# Patient Record
Sex: FEMALE | Race: WHITE | NOT HISPANIC OR LATINO | Employment: FULL TIME | URBAN - METROPOLITAN AREA
[De-identification: names, ages, dates, MRNs, and addresses within clinical notes are randomized per-mention and may not be internally consistent; named-entity substitution may affect disease eponyms.]

---

## 2023-03-01 LAB
GLUCOSE THREE HOUR: 97 MG/DL
GLUCOSE TWO HOUR: 135 MG/DL
GLUCOSE, FASTING: 84 MG/DL
GLUCOSE, ONE HOUR: 169 MG/DL
GTTCM: NORMAL

## 2023-05-12 LAB — GROUP B STREP SCREEN: NORMAL

## 2023-08-10 LAB
ALANINE AMINOTRANSFERASE (SGPT) (U/L) IN SER/PLAS: 33 U/L (ref 7–45)
ALBUMIN (G/DL) IN SER/PLAS: 4.4 G/DL (ref 3.4–5)
ALKALINE PHOSPHATASE (U/L) IN SER/PLAS: 111 U/L (ref 33–110)
ANION GAP IN SER/PLAS: 13 MMOL/L (ref 10–20)
APPEARANCE, URINE: CLEAR
ASPARTATE AMINOTRANSFERASE (SGOT) (U/L) IN SER/PLAS: 20 U/L (ref 9–39)
BILIRUBIN TOTAL (MG/DL) IN SER/PLAS: 0.4 MG/DL (ref 0–1.2)
BILIRUBIN, URINE: NEGATIVE
BLOOD, URINE: NEGATIVE
CALCIDIOL (25 OH VITAMIN D3) (NG/ML) IN SER/PLAS: 32 NG/ML
CALCIUM (MG/DL) IN SER/PLAS: 9.7 MG/DL (ref 8.6–10.3)
CARBON DIOXIDE, TOTAL (MMOL/L) IN SER/PLAS: 24 MMOL/L (ref 21–32)
CHLORIDE (MMOL/L) IN SER/PLAS: 106 MMOL/L (ref 98–107)
CHOLESTEROL (MG/DL) IN SER/PLAS: 196 MG/DL (ref 0–199)
CHOLESTEROL IN HDL (MG/DL) IN SER/PLAS: 60.2 MG/DL
CHOLESTEROL/HDL RATIO: 3.3
COLOR, URINE: YELLOW
CREATININE (MG/DL) IN SER/PLAS: 0.64 MG/DL (ref 0.5–1.05)
ERYTHROCYTE DISTRIBUTION WIDTH (RATIO) BY AUTOMATED COUNT: 12.4 % (ref 11.5–14.5)
ERYTHROCYTE MEAN CORPUSCULAR HEMOGLOBIN CONCENTRATION (G/DL) BY AUTOMATED: 32.1 G/DL (ref 32–36)
ERYTHROCYTE MEAN CORPUSCULAR VOLUME (FL) BY AUTOMATED COUNT: 90 FL (ref 80–100)
ERYTHROCYTES (10*6/UL) IN BLOOD BY AUTOMATED COUNT: 5.19 X10E12/L (ref 4–5.2)
GFR FEMALE: >90 ML/MIN/1.73M2
GLUCOSE (MG/DL) IN SER/PLAS: 82 MG/DL (ref 74–99)
GLUCOSE, URINE: NEGATIVE MG/DL
HEMATOCRIT (%) IN BLOOD BY AUTOMATED COUNT: 46.7 % (ref 36–46)
HEMOGLOBIN (G/DL) IN BLOOD: 15 G/DL (ref 12–16)
KETONES, URINE: NEGATIVE MG/DL
LDL: 124 MG/DL (ref 0–99)
LEUKOCYTE ESTERASE, URINE: NEGATIVE
LEUKOCYTES (10*3/UL) IN BLOOD BY AUTOMATED COUNT: 10.3 X10E9/L (ref 4.4–11.3)
NITRITE, URINE: NEGATIVE
PH, URINE: 5 (ref 5–8)
PLATELETS (10*3/UL) IN BLOOD AUTOMATED COUNT: 363 X10E9/L (ref 150–450)
POTASSIUM (MMOL/L) IN SER/PLAS: 4.1 MMOL/L (ref 3.5–5.3)
PROTEIN TOTAL: 7.7 G/DL (ref 6.4–8.2)
PROTEIN, URINE: NEGATIVE MG/DL
SODIUM (MMOL/L) IN SER/PLAS: 139 MMOL/L (ref 136–145)
SPECIFIC GRAVITY, URINE: 1.01 (ref 1–1.03)
THYROTROPIN (MIU/L) IN SER/PLAS BY DETECTION LIMIT <= 0.05 MIU/L: 1.85 MIU/L (ref 0.44–3.98)
TRIGLYCERIDE (MG/DL) IN SER/PLAS: 61 MG/DL (ref 0–149)
UREA NITROGEN (MG/DL) IN SER/PLAS: 12 MG/DL (ref 6–23)
UROBILINOGEN, URINE: <2 MG/DL (ref 0–1.9)
VLDL: 12 MG/DL (ref 0–40)

## 2023-10-20 ENCOUNTER — PATIENT MESSAGE (OUTPATIENT)
Dept: PRIMARY CARE | Facility: CLINIC | Age: 38
End: 2023-10-20
Payer: COMMERCIAL

## 2023-10-20 DIAGNOSIS — E01.0 THYROMEGALY: Primary | ICD-10-CM

## 2023-10-31 ENCOUNTER — HOSPITAL ENCOUNTER (OUTPATIENT)
Dept: RADIOLOGY | Facility: HOSPITAL | Age: 38
Discharge: HOME | End: 2023-10-31
Payer: COMMERCIAL

## 2023-10-31 DIAGNOSIS — E01.0 THYROMEGALY: ICD-10-CM

## 2023-10-31 PROCEDURE — 76536 US EXAM OF HEAD AND NECK: CPT | Performed by: STUDENT IN AN ORGANIZED HEALTH CARE EDUCATION/TRAINING PROGRAM

## 2023-10-31 PROCEDURE — 76536 US EXAM OF HEAD AND NECK: CPT

## 2023-11-01 ENCOUNTER — TELEMEDICINE (OUTPATIENT)
Dept: PRIMARY CARE | Facility: CLINIC | Age: 38
End: 2023-11-01
Payer: COMMERCIAL

## 2023-11-01 DIAGNOSIS — E04.1 THYROID NODULE: Primary | ICD-10-CM

## 2023-11-01 PROBLEM — S92.909A FRACTURE OF FOOT: Status: ACTIVE | Noted: 2023-11-01

## 2023-11-01 PROBLEM — O09.529 AMA (ADVANCED MATERNAL AGE) MULTIGRAVIDA 35+ (HHS-HCC): Status: ACTIVE | Noted: 2023-11-01

## 2023-11-01 PROBLEM — S93.622D: Status: ACTIVE | Noted: 2023-11-01

## 2023-11-01 PROBLEM — M21.41 PES PLANUS OF RIGHT FOOT: Status: ACTIVE | Noted: 2023-11-01

## 2023-11-01 PROBLEM — Z30.9 CONTRACEPTION MANAGEMENT: Status: ACTIVE | Noted: 2023-11-01

## 2023-11-01 PROBLEM — B37.0 ORAL CANDIDIASIS: Status: ACTIVE | Noted: 2023-11-01

## 2023-11-01 PROBLEM — I47.11 INAPPROPRIATE SINUS TACHYCARDIA (CMS-HCC): Status: ACTIVE | Noted: 2023-11-01

## 2023-11-01 PROBLEM — R00.2 PALPITATIONS: Status: ACTIVE | Noted: 2023-11-01

## 2023-11-01 PROBLEM — M79.672 LEFT FOOT PAIN: Status: ACTIVE | Noted: 2023-11-01

## 2023-11-01 PROBLEM — M25.572 LEFT ANKLE PAIN: Status: ACTIVE | Noted: 2023-11-01

## 2023-11-01 PROCEDURE — 99443 PR PHYS/QHP TELEPHONE EVALUATION 21-30 MIN: CPT | Performed by: INTERNAL MEDICINE

## 2023-11-01 RX ORDER — CHOLECALCIFEROL (VITAMIN D3) 50 MCG
2000 TABLET ORAL
COMMUNITY

## 2023-11-01 RX ORDER — B-COMPLEX WITH VITAMIN C
1 TABLET ORAL DAILY
COMMUNITY
End: 2024-01-10 | Stop reason: WASHOUT

## 2023-11-01 RX ORDER — FLUCONAZOLE 100 MG/1
100 TABLET ORAL DAILY
COMMUNITY
End: 2023-11-01 | Stop reason: WASHOUT

## 2023-11-01 RX ORDER — DILTIAZEM HYDROCHLORIDE 120 MG/1
120 CAPSULE, COATED, EXTENDED RELEASE ORAL DAILY
COMMUNITY
End: 2024-01-10 | Stop reason: WASHOUT

## 2023-11-01 ASSESSMENT — ENCOUNTER SYMPTOMS
BLOOD IN STOOL: 0
SPEECH DIFFICULTY: 0
HEMATURIA: 0
LOSS OF SENSATION IN FEET: 0
LIGHT-HEADEDNESS: 0
OCCASIONAL FEELINGS OF UNSTEADINESS: 0
FEVER: 0
ACTIVITY CHANGE: 0
CHEST TIGHTNESS: 0
JOINT SWELLING: 0
EYE REDNESS: 0
PALPITATIONS: 0
STRIDOR: 0
DEPRESSION: 0

## 2023-11-01 ASSESSMENT — PATIENT HEALTH QUESTIONNAIRE - PHQ9
2. FEELING DOWN, DEPRESSED OR HOPELESS: NOT AT ALL
1. LITTLE INTEREST OR PLEASURE IN DOING THINGS: NOT AT ALL
SUM OF ALL RESPONSES TO PHQ9 QUESTIONS 1 AND 2: 0

## 2023-11-01 NOTE — PROGRESS NOTES
In SUBJECTIVE:   Nan Thomas is a 38 y.o. female who presents for Results (To go over U/S of thyroid ).    HISTORY OF PRESENT ILLNESS:  Nan Thomas  is a pleasant 38-year-old female was on phone conference with me today.  She recently she had a thyroid ultrasound done.  She has 2 nodules which has gotten bigger size than in 2016.  During that time she also had thyroid nodule biopsy which was nonmalignant.  However her score is 4 and above.  The recommendation is also to get a biopsy this time.  In the past she had biopsy done by the interventional radiologist.  Patient prefers to go to the interventional radiologist at this time.  She denies any weight loss, hoarse voice.  However she does have tonsil stones and always clearing her voice.  She denies any postnasal drip.      Review of Systems   Constitutional:  Negative for activity change and fever.   HENT:  Negative for hearing loss, nosebleeds and tinnitus.    Eyes:  Negative for redness.   Respiratory:  Negative for chest tightness and stridor.    Cardiovascular:  Negative for chest pain, palpitations and leg swelling.   Gastrointestinal:  Negative for blood in stool.   Endocrine: Negative for cold intolerance.   Genitourinary:  Negative for hematuria.   Musculoskeletal:  Negative for joint swelling.   Skin:  Negative for rash.   Neurological:  Negative for speech difficulty and light-headedness.   Psychiatric/Behavioral:  Negative for behavioral problems.       Wt Readings from Last 30 Encounters:   07/05/23 112 kg (246 lb)   06/29/23 110 kg (242 lb 4 oz)   05/31/23 110 kg (243 lb)   05/16/23 122 kg (268 lb)   05/10/23 120 kg (265 lb)   04/24/23 119 kg (262 lb 8 oz)   04/19/23 119 kg (261 lb 8 oz)   04/12/23 116 kg (255 lb 4 oz)   03/29/23 114 kg (252 lb)   03/29/23 115 kg (253 lb 4 oz)   03/10/23 113 kg (250 lb)   03/01/23 113 kg (248 lb 10.9 oz)   02/15/23 111 kg (245 lb)   02/06/23 110 kg (243 lb)   01/13/23 112 kg (247 lb)   12/27/22 112 kg (247  lb)   12/07/22 109 kg (240 lb)   11/02/22 110 kg (242 lb 6 oz)   08/17/22 111 kg (244 lb 4 oz)   08/03/22 111 kg (244 lb)   07/06/22 113 kg (250 lb)   03/30/22 114 kg (250 lb 7 oz)          Physical Exam  Constitutional:       General: She is not in acute distress.  HENT:      Nose: No rhinorrhea.   Pulmonary:      Effort: Pulmonary effort is normal.      Breath sounds: No stridor. No wheezing.   Neurological:      Mental Status: She is alert and oriented to person, place, and time.   Psychiatric:         Thought Content: Thought content normal.            RECENT LABS:  Lab Results   Component Value Date    WBC 10.3 08/10/2023    HGB 15.0 08/10/2023    HCT 46.7 (H) 08/10/2023     08/10/2023    CHOL 196 08/10/2023    TRIG 61 08/10/2023    HDL 60.2 08/10/2023    ALT 33 08/10/2023    AST 20 08/10/2023     08/10/2023    K 4.1 08/10/2023     08/10/2023    CREATININE 0.64 08/10/2023    BUN 12 08/10/2023    CO2 24 08/10/2023    TSH 1.85 08/10/2023    HGBA1C 5.4 11/02/2022       IMAGING:  US thyroid    Result Date: 11/1/2023  1. Interval increase in the size of bilateral TIRADS 4 nodules, since 2016. Please see below for recommendations.   Please note that these statements are based on the recommendations of the [employer]   TI-RADS grading system. ACR TI-RADS recommendations (apply to nodules which have NOT been biopsied):   TR5 (7 or more points) highly suspicious - FNA if ? 1cm, follow-up if 0.5 -0.9 cm every year for 5 years. Aggregate cancer risk 35%. TR4 (4-6 points) moderately suspicious - FNA if ? 1.5cm, follow-up if 1 -1.4 cm in 1, 2, 3 and 5 years. Aggregate cancer risk 9.1% TR3 (3 points) mildly suspicious - FNA if ? 2.5cm, follow-up if 1.5 -2.4 cm in 1, 3 and 5 years. Aggregate cancer risk 4.8% TR2 (2 points) not suspicious. No FNA or follow-up. Aggregate cancer risk 1.5% TR1 (0 points) benign - No FNA or follow-up. Aggregate cancer risk 0.3%   Signed by: Cortez Neal  11/1/2023 9:38 AM Dictation workstation:   [id number]      MEDICATIONS:   Current Outpatient Medications   Medication Instructions    cholecalciferol (VITAMIN D-3) 2,000 Units, oral, Daily RT    dilTIAZem CD (CARDIZEM CD) 120 mg, oral, Daily    prenatal vitamin, iron-folic, (Prenatal Vitamin) 27 mg iron-800 mcg folic acid tablet 1 tablet, oral, Daily        TODAY'S VISIT  DX:   1. Thyroid nodule  Consult to Interventional Radiology             MEDICAL DECISION MAKING:  A. Recent lab work and relevant imaging studies have been reviewed.    B. Relevant correspondence/notes from specialty consultants were reviewed and discussed with patient.    C. The current active medical co morbidities have been considered.   D.  Patient was referred to interventional radiology to go over the results and if needed to have FNA.    E. Patient will continue with current medical therapy and plan.   F. I will see patient back in 6 months.

## 2023-11-14 ENCOUNTER — PATIENT MESSAGE (OUTPATIENT)
Dept: PRIMARY CARE | Facility: CLINIC | Age: 38
End: 2023-11-14
Payer: COMMERCIAL

## 2023-11-14 DIAGNOSIS — J31.2 CHRONIC SORE THROAT: ICD-10-CM

## 2023-11-14 DIAGNOSIS — B37.0 ORAL CANDIDIASIS: Primary | ICD-10-CM

## 2023-11-28 ENCOUNTER — TELEPHONE (OUTPATIENT)
Dept: PRIMARY CARE | Facility: CLINIC | Age: 38
End: 2023-11-28
Payer: COMMERCIAL

## 2023-11-28 DIAGNOSIS — E04.2 MULTIPLE THYROID NODULES: ICD-10-CM

## 2023-11-28 NOTE — TELEPHONE ENCOUNTER
radiology caklled and they need the US guided thyroid biopsy. If you could adjust it to say for mulit thyroid nodule to be biopsied.

## 2023-12-04 ENCOUNTER — APPOINTMENT (OUTPATIENT)
Dept: RADIOLOGY | Facility: HOSPITAL | Age: 38
End: 2023-12-04
Payer: COMMERCIAL

## 2023-12-04 ENCOUNTER — HOSPITAL ENCOUNTER (OUTPATIENT)
Dept: RADIOLOGY | Facility: HOSPITAL | Age: 38
Discharge: HOME | End: 2023-12-04
Payer: COMMERCIAL

## 2023-12-04 VITALS
OXYGEN SATURATION: 96 % | DIASTOLIC BLOOD PRESSURE: 81 MMHG | SYSTOLIC BLOOD PRESSURE: 122 MMHG | HEART RATE: 73 BPM | RESPIRATION RATE: 16 BRPM

## 2023-12-04 DIAGNOSIS — E04.2 MULTIPLE THYROID NODULES: ICD-10-CM

## 2023-12-04 PROCEDURE — 88112 CYTOPATH CELL ENHANCE TECH: CPT | Mod: TC,59 | Performed by: INTERNAL MEDICINE

## 2023-12-04 PROCEDURE — 96372 THER/PROPH/DIAG INJ SC/IM: CPT | Mod: 59 | Performed by: RADIOLOGY

## 2023-12-04 PROCEDURE — 2500000004 HC RX 250 GENERAL PHARMACY W/ HCPCS (ALT 636 FOR OP/ED): Performed by: RADIOLOGY

## 2023-12-04 PROCEDURE — 88112 CYTOPATH CELL ENHANCE TECH: CPT | Performed by: PATHOLOGY

## 2023-12-04 PROCEDURE — 60100 BIOPSY OF THYROID: CPT

## 2023-12-04 PROCEDURE — 10005 FNA BX W/US GDN 1ST LES: CPT | Performed by: RADIOLOGY

## 2023-12-04 PROCEDURE — 10006 FNA BX W/US GDN EA ADDL: CPT | Performed by: RADIOLOGY

## 2023-12-04 PROCEDURE — 88173 CYTOPATH EVAL FNA REPORT: CPT | Performed by: PATHOLOGY

## 2023-12-04 PROCEDURE — 10006 FNA BX W/US GDN EA ADDL: CPT

## 2023-12-04 RX ORDER — LIDOCAINE IN NACL,ISO-OSMOT/PF 30 MG/3 ML
SYRINGE (ML) INJECTION
Status: COMPLETED | OUTPATIENT
Start: 2023-12-04 | End: 2023-12-04

## 2023-12-04 RX ADMIN — Medication 2 ML: at 10:40

## 2023-12-04 RX ADMIN — Medication 3 ML: at 10:52

## 2023-12-04 ASSESSMENT — PAIN SCALES - GENERAL
PAINLEVEL_OUTOF10: 0 - NO PAIN
PAINLEVEL_OUTOF10: 0 - NO PAIN

## 2023-12-04 ASSESSMENT — PAIN - FUNCTIONAL ASSESSMENT: PAIN_FUNCTIONAL_ASSESSMENT: 0-10

## 2023-12-05 ENCOUNTER — OFFICE VISIT (OUTPATIENT)
Dept: OTOLARYNGOLOGY | Facility: CLINIC | Age: 38
End: 2023-12-05
Payer: COMMERCIAL

## 2023-12-05 DIAGNOSIS — B37.0 ORAL CANDIDIASIS: ICD-10-CM

## 2023-12-05 DIAGNOSIS — J31.2 CHRONIC SORE THROAT: ICD-10-CM

## 2023-12-05 DIAGNOSIS — J34.89 NASAL VESTIBULITIS: Primary | ICD-10-CM

## 2023-12-05 PROCEDURE — 1036F TOBACCO NON-USER: CPT | Performed by: PHYSICIAN ASSISTANT

## 2023-12-05 PROCEDURE — 99203 OFFICE O/P NEW LOW 30 MIN: CPT | Performed by: PHYSICIAN ASSISTANT

## 2023-12-05 PROCEDURE — RXMED WILLOW AMBULATORY MEDICATION CHARGE

## 2023-12-05 PROCEDURE — 31575 DIAGNOSTIC LARYNGOSCOPY: CPT | Performed by: OTOLARYNGOLOGY

## 2023-12-05 RX ORDER — MUPIROCIN 20 MG/G
OINTMENT TOPICAL
Qty: 22 G | Refills: 2 | Status: SHIPPED | OUTPATIENT
Start: 2023-12-05 | End: 2024-01-10

## 2023-12-06 ENCOUNTER — OFFICE VISIT (OUTPATIENT)
Dept: PRIMARY CARE | Facility: CLINIC | Age: 38
End: 2023-12-06
Payer: COMMERCIAL

## 2023-12-06 VITALS
WEIGHT: 254.38 LBS | BODY MASS INDEX: 46.81 KG/M2 | TEMPERATURE: 98.3 F | DIASTOLIC BLOOD PRESSURE: 84 MMHG | SYSTOLIC BLOOD PRESSURE: 120 MMHG | HEIGHT: 62 IN | OXYGEN SATURATION: 97 % | HEART RATE: 79 BPM

## 2023-12-06 DIAGNOSIS — M77.9 BONE SPUR: Primary | ICD-10-CM

## 2023-12-06 PROCEDURE — 1036F TOBACCO NON-USER: CPT | Performed by: INTERNAL MEDICINE

## 2023-12-06 PROCEDURE — 99213 OFFICE O/P EST LOW 20 MIN: CPT | Performed by: INTERNAL MEDICINE

## 2023-12-06 PROCEDURE — 3008F BODY MASS INDEX DOCD: CPT | Performed by: INTERNAL MEDICINE

## 2023-12-06 ASSESSMENT — PATIENT HEALTH QUESTIONNAIRE - PHQ9
SUM OF ALL RESPONSES TO PHQ9 QUESTIONS 1 AND 2: 0
2. FEELING DOWN, DEPRESSED OR HOPELESS: NOT AT ALL
1. LITTLE INTEREST OR PLEASURE IN DOING THINGS: NOT AT ALL

## 2023-12-06 NOTE — PROGRESS NOTES
Nan Thomas is a 38 y.o. year old female patient with Sore Throat and Thrush       Patient presents to the office for assessment of throat.  Patient reports several week history of throat irritation with clearing.  Patient complains of some intermittent sore throat as well as nasal vestibulitis concerns on the left.  For the throat the patient reports that she was treated for possible oral candidiasis but did not resolve issues.  She states that it has improved but not completely resolved.  He does have a history of thyroid nodules and is scheduled for biopsy in the near future.  She is without other ENT concerns.    Review of Systems   All other systems reviewed and are negative.        Physical Exam:   General appearance: No acute distress. Normal facies. Symmetric facial movement. No gross lesions of the face are noted.  The external ear structures appear normal. The ear canals patent and the tympanic membranes are intact without evidence of air-fluid levels, retraction, or congenital defects.  Anterior rhinoscopy notes essentially a midline nasal septum. Examination is noted for normal healthy mucosal membranes without any evidence of lesions, polyps, or exudate. The tongue is normally mobile. There are no lesions on the gingiva, buccal, or oral mucosa. There are no oral cavity masses.  Patient with nasal vestibulitis left  The neck is negative for mass lymphadenopathy. The trachea and parotid are clear. The thyroid bed is grossly unremarkable. The salivary gland structures are grossly unremarkable.    In order to assess the larynx, flexible laryngoscopy was performed based on the patient's history.    Procedure:   After topical anesthesia, a very complete flexible laryngoscopy was performed. This examination reveals a normal appearance to the laryngeal structures including the true cords, false cords, epiglottis, base of tongue, and piriform sinus, except as noted.  Assessment/Plan   Nasal  vestibulitis  Sore throat  Patient seen in the office today for assessment of throat.  Throat appears very well.  Flexible endoscope was within normal limits.  At this time I favor observation.  Patient does have nasal vestibulitis and will be treated with Bactroban.  Patient remains 6 weeks and about time we will follow-up on nose and throat as well as review thyroid biopsy.

## 2023-12-08 LAB
LABORATORY COMMENT REPORT: NORMAL
LABORATORY COMMENT REPORT: NORMAL
PATH REPORT.FINAL DX SPEC: NORMAL
PATH REPORT.GROSS SPEC: NORMAL
PATH REPORT.RELEVANT HX SPEC: NORMAL
PATH REPORT.TOTAL CANCER: NORMAL

## 2023-12-12 ENCOUNTER — HOSPITAL ENCOUNTER (OUTPATIENT)
Dept: RADIOLOGY | Facility: HOSPITAL | Age: 38
Discharge: HOME | End: 2023-12-12
Payer: COMMERCIAL

## 2023-12-12 DIAGNOSIS — M77.9 BONE SPUR: ICD-10-CM

## 2023-12-12 PROCEDURE — 73130 X-RAY EXAM OF HAND: CPT | Mod: RT

## 2023-12-12 PROCEDURE — 73130 X-RAY EXAM OF HAND: CPT | Mod: RIGHT SIDE | Performed by: RADIOLOGY

## 2023-12-22 ENCOUNTER — PHARMACY VISIT (OUTPATIENT)
Dept: PHARMACY | Facility: CLINIC | Age: 38
End: 2023-12-22
Payer: COMMERCIAL

## 2023-12-28 ENCOUNTER — PHARMACY VISIT (OUTPATIENT)
Dept: PHARMACY | Facility: CLINIC | Age: 38
End: 2023-12-28
Payer: COMMERCIAL

## 2023-12-28 PROCEDURE — RXMED WILLOW AMBULATORY MEDICATION CHARGE

## 2023-12-28 RX ORDER — AMOXICILLIN AND CLAVULANATE POTASSIUM 875; 125 MG/1; MG/1
1 TABLET, FILM COATED ORAL 2 TIMES DAILY
Qty: 14 TABLET | Refills: 0 | OUTPATIENT
Start: 2023-12-28 | End: 2024-01-10 | Stop reason: WASHOUT

## 2023-12-31 ASSESSMENT — ENCOUNTER SYMPTOMS
SPEECH DIFFICULTY: 0
BLOOD IN STOOL: 0
ACTIVITY CHANGE: 0
JOINT SWELLING: 0
CHEST TIGHTNESS: 0
PALPITATIONS: 0
LIGHT-HEADEDNESS: 0
HEMATURIA: 0
STRIDOR: 0
EYE REDNESS: 0
FEVER: 0

## 2023-12-31 NOTE — PROGRESS NOTES
"CHIEF COMPLAINT:    Nan Thomas is a 38 y.o. female who presents for Mass (Patient in office today for a bump on her right middle finger. Pt states that it hurts when she uses her hand all day. ).    HISTORY OF PRESENT ILLNESS:  Nan Thomas  is a pleasant 38-year-old female has a bony bump in the right middle finger.  It hurts when she works her day-to-day work.  She wants to be sure that it is not malignant tumor.  Otherwise she is functioning normally.        Review of Systems   Constitutional:  Negative for activity change and fever.   HENT:  Negative for hearing loss, nosebleeds and tinnitus.    Eyes:  Negative for redness.   Respiratory:  Negative for chest tightness and stridor.    Cardiovascular:  Negative for chest pain, palpitations and leg swelling.   Gastrointestinal:  Negative for blood in stool.   Endocrine: Negative for cold intolerance.   Genitourinary:  Negative for hematuria.   Musculoskeletal:  Negative for joint swelling.   Skin:  Negative for rash.   Neurological:  Negative for speech difficulty and light-headedness.   Psychiatric/Behavioral:  Negative for behavioral problems.      Visit Vitals  /84 (BP Location: Right arm, Patient Position: Sitting)   Pulse 79   Temp 36.8 °C (98.3 °F) (Temporal)   Ht 1.575 m (5' 2\")   Wt 115 kg (254 lb 6 oz)   SpO2 97%   BMI 46.53 kg/m²   Smoking Status Former   BSA 2.24 m²         Wt Readings from Last 10 Encounters:   12/06/23 115 kg (254 lb 6 oz)   08/08/23 112 kg (246 lb 2 oz)   08/01/23 111 kg (244 lb 3.2 oz)   07/05/23 112 kg (246 lb)   06/29/23 110 kg (242 lb 4 oz)   05/31/23 110 kg (243 lb)   05/16/23 122 kg (268 lb)   05/10/23 120 kg (265 lb)   04/24/23 119 kg (262 lb 8 oz)   04/19/23 119 kg (261 lb 8 oz)       Physical Exam  Constitutional:       General: She is not in acute distress.     Appearance: Normal appearance.   HENT:      Head: Normocephalic.      Right Ear: Tympanic membrane normal.      Left Ear: Tympanic membrane normal.    "   Mouth/Throat:      Mouth: Mucous membranes are moist.   Cardiovascular:      Rate and Rhythm: Normal rate and regular rhythm.      Heart sounds: No murmur heard.  Pulmonary:      Effort: No respiratory distress.   Abdominal:      Palpations: Abdomen is soft.   Musculoskeletal:      Cervical back: Neck supple.      Right lower leg: No edema.      Left lower leg: No edema.   Skin:     Findings: No rash.   Neurological:      General: No focal deficit present.      Mental Status: She is alert and oriented to person, place, and time.   Psychiatric:         Mood and Affect: Mood normal.        RECENT LABS:  Lab Results   Component Value Date    WBC 10.3 08/10/2023    HGB 15.0 08/10/2023    HCT 46.7 (H) 08/10/2023     08/10/2023    CHOL 196 08/10/2023    TRIG 61 08/10/2023    HDL 60.2 08/10/2023    ALT 33 08/10/2023    AST 20 08/10/2023     08/10/2023    K 4.1 08/10/2023     08/10/2023    CREATININE 0.64 08/10/2023    BUN 12 08/10/2023    CO2 24 08/10/2023    TSH 1.85 08/10/2023    HGBA1C 5.4 11/02/2022     IMAGING:  Reviewed:   MEDICATIONS:   Current Outpatient Medications   Medication Instructions    amoxicillin-pot clavulanate (Augmentin) 875-125 mg tablet 875 mg, oral, 2 times daily    cholecalciferol (VITAMIN D-3) 2,000 Units, oral, Daily RT    dilTIAZem CD (CARDIZEM CD) 120 mg, oral, Daily    mupirocin (Bactroban) 2 % ointment Apply topically to affected area(s) 3 times a day.    prenatal vitamin, iron-folic, (Prenatal Vitamin) 27 mg iron-800 mcg folic acid tablet 1 tablet, oral, Daily      TODAY'S VISIT  DX:   1. Bone spur  : XR hand right 1-2 views         MEDICAL DECISION MAKING:  - Recent lab work and relevant imaging studies have been reviewed.    - The current active medical co morbidities have been considered.   - - Patient was given treatment as per above plan.   - Patient will continue with current medical therapy and plan.   - Next Follow up in 6-month.

## 2024-01-05 ENCOUNTER — OFFICE VISIT (OUTPATIENT)
Dept: OTOLARYNGOLOGY | Facility: CLINIC | Age: 39
End: 2024-01-05
Payer: COMMERCIAL

## 2024-01-05 DIAGNOSIS — R09.89 THROAT CLEARING: Primary | ICD-10-CM

## 2024-01-05 PROCEDURE — 3008F BODY MASS INDEX DOCD: CPT | Performed by: OTOLARYNGOLOGY

## 2024-01-05 PROCEDURE — 99213 OFFICE O/P EST LOW 20 MIN: CPT | Performed by: OTOLARYNGOLOGY

## 2024-01-05 PROCEDURE — 1036F TOBACCO NON-USER: CPT | Performed by: OTOLARYNGOLOGY

## 2024-01-05 NOTE — PROGRESS NOTES
Nan Thomas is a 38 y.o. year old female patient with FU ON THROAT / NECK       Patient returns to the office today for follow-up on throat reporting frequent throat clearing.  Patient reports no significant change since last visit.  She did have ultrasound guided biopsy completed of thyroid which favored benign findings.  Patient was diagnosed with reported left ear infection recently as well.  The patient is without other ENT related concerns.  There have been no significant changes in past medical or past surgical histories except as mentioned.      Physical Exam:   General appearance: No acute distress. Normal facies. Symmetric facial movement. No gross lesions of the face are noted.  The external ear structures appear normal. The ear canals patent and the tympanic membranes are intact without evidence of air-fluid levels, retraction, or congenital defects.  Anterior rhinoscopy notes essentially a midline nasal septum. Examination is noted for normal healthy mucosal membranes without any evidence of lesions, polyps, or exudate. The tongue is normally mobile. There are no lesions on the gingiva, buccal, or oral mucosa. There are no oral cavity masses.  The neck is negative for mass lymphadenopathy. The trachea and parotid are clear. The thyroid bed is grossly unremarkable. The salivary gland structures are grossly unremarkable.    Assessment/Plan   1.  Throat clearing  Patient seen in the office today for chronic throat clearing with unremarkable exam today.  Patient did have a benign biopsy of the thyroid since her last visit.  At this time recommendation for the patient is observation and see us back as needed.  Certainly the patient could consider utilization of nasal steroid to see if this helps alleviate throat clearing and drainage.    All questions were answered in this regard accordingly.

## 2024-01-10 ENCOUNTER — OFFICE VISIT (OUTPATIENT)
Dept: CARDIOLOGY | Facility: CLINIC | Age: 39
End: 2024-01-10
Payer: COMMERCIAL

## 2024-01-10 VITALS
SYSTOLIC BLOOD PRESSURE: 108 MMHG | HEART RATE: 80 BPM | BODY MASS INDEX: 48.03 KG/M2 | DIASTOLIC BLOOD PRESSURE: 72 MMHG | HEIGHT: 62 IN | WEIGHT: 261 LBS

## 2024-01-10 DIAGNOSIS — E66.01 MORBID OBESITY (MULTI): ICD-10-CM

## 2024-01-10 DIAGNOSIS — I47.11 INAPPROPRIATE SINUS TACHYCARDIA (CMS-HCC): Primary | ICD-10-CM

## 2024-01-10 DIAGNOSIS — R00.2 PALPITATIONS: ICD-10-CM

## 2024-01-10 PROCEDURE — 1036F TOBACCO NON-USER: CPT | Performed by: NURSE PRACTITIONER

## 2024-01-10 PROCEDURE — 3008F BODY MASS INDEX DOCD: CPT | Performed by: NURSE PRACTITIONER

## 2024-01-10 PROCEDURE — 99213 OFFICE O/P EST LOW 20 MIN: CPT | Performed by: NURSE PRACTITIONER

## 2024-01-10 NOTE — PROGRESS NOTES
"CARDIOLOGY OFFICE VISIT      CHIEF COMPLAINT  Chief Complaint   Patient presents with    Palpitations     Chief complaint: \"I stopped the diltiazem in November and I have not had any problems since.\"  HISTORY OF PRESENT ILLNESS  HPI  History: The patient is a 38-year-old  female who experienced inappropriate sinus tachycardia during pregnancy.  She was maintained on Cardizem 120 mg daily.  She reports that she stopped the Cardizem in November, 2023 and has not experienced any palpitations.  She denies chest pain, dizziness, lightheadedness, shortness of breath, abdominal distention, or lower extremity edema.  Past Medical History  History reviewed. No pertinent past medical history.    Social History  Social History     Tobacco Use    Smoking status: Former     Types: Cigarettes    Smokeless tobacco: Never   Substance Use Topics    Alcohol use: Not Currently    Drug use: Not Currently       Family History     Family History   Problem Relation Name Age of Onset    Cancer Mother      Hypertension Mother      Other (secondary malignancy of cervical esophagus with unkown primary) Mother      Hypertension Other grandparent         Allergies:  Allergies   Allergen Reactions    Sulfamethoxazole-Trimethoprim Unknown        Outpatient Medications:  Current Outpatient Medications   Medication Instructions    cholecalciferol (VITAMIN D-3) 2,000 Units, oral, Daily RT    multivitamin with minerals-folic acid-vitamin k-Co Q10 (Aquadeks) 100-350-5 mcg-mcg-mg chewable tablet 1 tablet, oral, Daily    mupirocin (Bactroban) 2 % ointment Apply topically to affected area(s) 3 times a day.          REVIEW OF SYSTEMS  Review of Systems   All other systems reviewed and are negative.        VITALS  Vitals:    01/10/24 1005   BP: 108/72   Pulse: 80       PHYSICAL EXAM  Vitals and nursing note reviewed.   Constitutional:       Appearance: Normal appearance.   HENT:      Head: Normocephalic.   Neck:      Vascular: No JVD. "   Cardiovascular:      Rate and Rhythm: Normal rate and regular rhythm.      Pulses: Normal pulses.      Heart sounds: Normal heart sounds.   Pulmonary:      Effort: Pulmonary effort is normal.      Breath sounds: Normal breath sounds.   Abdominal:      General: Bowel sounds are normal.      Palpations: Abdomen is soft.   Musculoskeletal:         General: Normal range of motion.      Cervical back: Normal range of motion.   Skin:     General: Skin is warm and dry.   Neurological:      General: No focal deficit present.      Mental Status: She is alert and oriented to person, place, and time.      Motor: Motor function is intact.   Psychiatric:         Attention and Perception: Attention and perception normal.         Mood and Affect: Mood and affect normal.         Speech: Speech normal.         Behavior: Behavior normal. Behavior is cooperative.         Thought Content: Thought content normal.         Cognition and Memory: Cognition and memory normal.     Labs and testing: Twelve-lead EKG reveals sinus arrhythmia without ectopics no acute ischemic changes.  Sinus rate is 80 bpm.  QRS durations 80 ms, QT is 360 ms, QTc 415 ms.      ASSESSMENT AND PLAN    Clinical impressions:  1.  Inappropriate sinus tachycardia in the setting of pregnancy, presently on no rate or rhythm controlling medications-patient denies tachyarrhythmic palpitations.  2.  Obesity with a BMI of 47.74.  3.  Normal left ventricular function per 2D echocardiogram dated July 1, 2015.    4.  Pulmonary hypertension with right ventricular systolic pressure 36 mmHg per 2D echocardiogram.  5.  Valvular heart disease consisting of mild MR and TR per 2D echocardiogram.  6.  Questionable thyroid nodule status post thyroid biopsy negative for malignancy.  Thyroid enlargement was noted.    Recommendations:  1.  Patient was instructed to continue lifestyle modifications including limiting caffeine and alcohol consumption and increasing water consumption to 64  ounces per day.  Increase in activity and exercise was also recommended to most days of the week.  2.  Patient will follow-up with electrophysiology as needed only.    Evaluation and note by Debbie Skinner CNP  **Please excuse any errors in grammar or translation related to this dictation.  Voice recognition software was utilized to prepare this document.**

## 2024-01-30 ENCOUNTER — HOSPITAL ENCOUNTER (OUTPATIENT)
Dept: RADIOLOGY | Facility: HOSPITAL | Age: 39
Discharge: HOME | End: 2024-01-30
Payer: COMMERCIAL

## 2024-01-30 DIAGNOSIS — S43.491A OTHER SPRAIN OF RIGHT SHOULDER JOINT, INITIAL ENCOUNTER: ICD-10-CM

## 2024-01-30 PROCEDURE — 73221 MRI JOINT UPR EXTREM W/O DYE: CPT | Mod: RIGHT SIDE | Performed by: RADIOLOGY

## 2024-01-30 PROCEDURE — 73221 MRI JOINT UPR EXTREM W/O DYE: CPT | Mod: RT

## 2024-02-07 ENCOUNTER — APPOINTMENT (OUTPATIENT)
Dept: PRIMARY CARE | Facility: CLINIC | Age: 39
End: 2024-02-07
Payer: COMMERCIAL

## 2024-02-22 ENCOUNTER — EVALUATION (OUTPATIENT)
Dept: PHYSICAL THERAPY | Facility: CLINIC | Age: 39
End: 2024-02-22
Payer: COMMERCIAL

## 2024-02-22 DIAGNOSIS — S46.011D STRAIN OF ROTATOR CUFF, RIGHT, SUBSEQUENT ENCOUNTER: Primary | ICD-10-CM

## 2024-02-22 DIAGNOSIS — S43.491A OTHER SPRAIN OF RIGHT SHOULDER JOINT, INITIAL ENCOUNTER: ICD-10-CM

## 2024-02-22 PROCEDURE — 97535 SELF CARE MNGMENT TRAINING: CPT | Mod: GP | Performed by: PHYSICAL THERAPIST

## 2024-02-22 PROCEDURE — 97161 PT EVAL LOW COMPLEX 20 MIN: CPT | Mod: GP | Performed by: PHYSICAL THERAPIST

## 2024-02-22 ASSESSMENT — ENCOUNTER SYMPTOMS
LOSS OF SENSATION IN FEET: 0
OCCASIONAL FEELINGS OF UNSTEADINESS: 0
DEPRESSION: 0

## 2024-02-22 ASSESSMENT — PAIN SCALES - GENERAL: PAINLEVEL_OUTOF10: 0 - NO PAIN

## 2024-02-22 ASSESSMENT — PAIN - FUNCTIONAL ASSESSMENT: PAIN_FUNCTIONAL_ASSESSMENT: 0-10

## 2024-02-22 NOTE — PROGRESS NOTES
PT Initial Evaluation    Patient Name:  Nan Thomas    MRN:  [id number]    :  1985    Today's Date:  24    Time Calculation  Start Time: 1610  Stop Time: 1645  Time Calculation (min): 35 min  PT Evaluation Time Entry  PT Evaluation (Low) Time Entry: 15  PT Therapeutic Procedures Time Entry  Self-Care/Home Mgmt Training: 15     Informed Consent  Patient has been informed of all evaluation findings and treatment plans and agrees to participate in Physical Therapy services and plans as outlined.    Diagnosis:  Diagnosis and Precautions: S46.011D R rotator cuff strain, subsequent encounter    Goals:   By the end of 12 visits patient will be able to do the following with < 0/10 R SHOULDER pain:    Basic ADL's:  Patient will perform bADL's/instrumental ADL's for 30-60 minutes moving between various UE reaching/lifting/carrying postures.    HEP:  Patient will consistently perform HER home exercise program for 20-30 minute sessions, 1-2x/day, 3-4 days/week independently.    ROM and Strength:  Patient will demonstrate 5/5 R SHOULDER strength in all planes to improve their ability to lift, reach, perform basic ADL's.    WORK:  Patient will return to WORK and perform normal WORK activities without R shoulder pain.    Sleep:  Patient will sleep thru the night 4/7 nights/week.    Participation restrictions:  Decrease Quick DASH to < or = to 0% disability for increased functional ability.    Pain:  Decrease pain at worst to < or = to 0/10 for improved QOL and ability to sleep.    No point tenderness noted over the R anterior shoulder.     Plan of Care:      Treatment/Interventions: Cryotherapy, Education/ Instruction, Electrical stimulation, Manual therapy, Neuromuscular re-education, Self care/ home management, Taping techniques, Therapeutic activities, Therapeutic exercises, Ultrasound, Vasopneumatic device  PT Plan: Skilled PT  PT Frequency: 2 times per week  Duration: 12 visits  Onset Date:  11/07/23  Certification Period Start Date: 02/22/24  Certification Period End Date: 05/22/24  Number of Treatments Authorized: 12  Rehab Potential: Good  Plan of Care Agreement: Patient    PT Assessment:    Patient is a 39 y.o. FEMALE with c/o R shoulder pain.   Patient is alert and oriented x 3.  Patient presents with medical diagnosis of R rotator cuff strain contributing to compensatory soft tissue dysfunction, pain, stiffness and weakness of the R shoulder.   Significant past medical history/past surgical history includes see above.    Skilled care is needed to progress the patient back to these activities without exacerbating symptoms.   Patient requires skilled PT services to address the problems identified and the individualized patient's goals as outlined in the problems and goals section of this evaluation.  A skilled PT is required to address these key impairments and to provide and progress with an appropriate home exercise program. Patient does not have any significant PMH influencing Rx and reports motivation to return to FUNCTIONAL ACTIVITY and RETURN TO WORK.   Patient demonstrates to be a good candidate for physical therapy with good rehab potential and verbalized a good understanding of HER diagnosis, prognosis and treatment.  Goals have been established and reviewed with the patient.      PT Assessment Results: Decreased strength, Decreased endurance, Pain  Rehab Prognosis: Good  Evaluation/Treatment Tolerance: Patient tolerated treatment well    Complexity:  Low complexity evaluation  due to a 15  minute duration, a past medical history WITHOUT any personal factors and/or comorbidities that could impact the POC, examination of body systems completed on one to two elements, the patient presents with a stable condition, and clinical decision making using the Quick DASH was of low complexity.     Prognosis:  Rehab Prognosis: Good    Problem List  Decreased Functional Level, Decreased knowledge of  HEP, Pain, Strength, and Endurance    Impairments   IMPAIRED STRENGTH UPPER BODY, INCREASED PAIN, IMPAIRED FUNCTIONAL ACTIVITY LEVEL, and IMPAIRED FUNCTIONAL MOVEMENT PATTERNS    Functional Limitations:  LIMITATIONS PERFORMING BASIC ADL'S, ISSUES WITH SLEEP, WORK ISSUES, PARTICIPATION IN HOBBIES, PARTICIPATION IN LEISURE ACTIVITIES, and PARTICIPATION IN HOME MANAGEMENT    General Visit Information:  Reason for Referral: PT Evaluate and Treat  Referred By: Dr. Kirit Mittal  General Comment: S46.011D R rotator cuff strain, subsequent encounter    Pre-Cautions:  STEADI Fall Risk Score (The score of 4 or more indicates an increased risk of falling): 3     Medical Precautions:  (SVT, , corneal Lasik surgery))     Reason for Visit:  PT Evaluate and Treat    Initial Evaluation:  Referred By: Dr. Kirit Mittal    Insurance  Insurance reviewed  Name of Insurance:  Industrial Employee [facility]  Visit No.  1  * (EVAL) RIGHT SHOULDER STRAIN S43.491A; C9 FOR 12V PHYSICAL THERAPY SUBMITTED TO  DISABILITY ON 24 BY SELINA CEDILLO AT  [employer]. // Kalpana confirmed 24 5:37pm   12 visits approved  2024-3/17/2024    Subjective:    Current Episode  Date of Onset:  2023  Mechanism of injury:  Patient reports tripping at work over an object on 2023 and sustaining a FOOSH injury to the R shoulder (no surgery).    Pain Score:  Pain Assessment: 0-10  Pain Assessment  Pain Assessment: 0-10  Pain Score: 0 - No pain (0/10 least, 5/10 worst)  Pain Type: Chronic pain  Pain Location: Shoulder  Pain Orientation: Right, Anterior  Pain Radiating Towards: R elbow  Pain Descriptors: Aching, Burning, Numbness, Sharp, Tingling  Pain Frequency: Intermittent  Pain Type: Chronic pain  Pain Location: Shoulder  Pain Orientation: Right, Anterior  Pain Descriptors: Aching, Burning, Numbness, Sharp, Tingling  Pain Frequency: Intermittent    Better with:  exercise    Worse with:  reaching overhead, reaching behind the back, sliding patients  at work, sliding carts at work    Medical History/Surgical History:  Medical Precautions:  (SVT, , corneal Lasik surgery)    Reviewed medical history form with patient (medications/allergies reviewed with patient).  Current Outpatient Medications   Medication Instructions    cholecalciferol (VITAMIN D-3) 2,000 Units, oral, Daily RT    multivitamin with minerals-folic acid-vitamin k-Co Q10 (Aquadeks) 100-350-5 mcg-mcg-mg chewable tablet 1 tablet, oral, Daily     Radiology:  Study Result    Narrative & Impression   Interpreted By:  Kareem Ortiz,   STUDY:  MR SHOULDER RIGHT WO IV CONTRAST;  2024 12:02 pm      INDICATION:  Signs/Symptoms:Rt shoulder strain. Patient fell 2 months ago with  continued right shoulder pain and weakness radiating into arm. No  previous surgery.      COMPARISON:  None.      ACCESSION NUMBER(S):  [id number]      ORDERING CLINICIAN:  YEISON GARCIA      TECHNIQUE:  Multiplanar and multisequential MR images of the right shoulder  performed.      FINDINGS:  A small volume of joint fluid present. There is no sizable joint  effusion. There is trace fluid accumulation in the  subacromial/subdeltoid bursa.      There are mild changes of acromioclavicular arthrosis with fluid in  the joint space. Small marginal osteophytes are identified which  contacts the subjacent supraspinatus myotendinous junction  anteriorly. The joint space measures 9 mm in maximum diameter. There  is minor subcortical degenerative bone marrow signal and cystic  changes at the greater tuberosity. There is otherwise no sign of  acute bone marrow edema, fracture, or osseous mass.      The extra-articular long head biceps tendon is intact and normally  positioned. The intra-articular tendon demonstrates some intermediate  intrasubstance signal at the biceps anchor though is otherwise intact.      The supraspinatus tendon demonstrates intermediate intrasubstance  signal at the tendon insertion extending to the  bursal and articular  surface. Centrally there is relative increased T2 weighted signal.  There is no full-thickness tendon discontinuity or retraction. There  is no muscle edema or atrophy.      The infraspinatus tendon demonstrates mild intermediate  intrasubstance signal at the distal tendon extending to the insertion  site. There is no focal or full-thickness tendon discontinuity or  tendon retraction. There is no muscle edema or atrophy.      The teres minor tendon is intact. There is mild muscle volume loss  and intermediate signal compatible with denervation.      The subscapularis tendon demonstrates some intermediate  intrasubstance signal at the tendon insertion. This is not increased  fluid signal with T2 weighting. There is no full-thickness tendon gap  or retraction. There is no muscle edema or atrophy.      The surrounding soft tissues are unremarkable.      IMPRESSION:  Insertional supraspinatus, infraspinatus, and subscapularis  tendinopathy. There is no full-thickness tendon tear. There is no  muscle atrophy.      Teres minor muscle denervation. The tendon is intact.      The long head biceps tendon is intact and normally positioned. There  is some intrasubstance degenerative signal at the biceps anchor.      Mild acromioclavicular arthrosis with some prominence of the joint  space and fluid signal in the joint.      No acute osseous abnormality.     Functional Assessment:  Level of [place]:  Level of [place]: Independent with ADLs and functional transfers    Functional Limitations:  reaching overhead, reaching behind the back, sliding patients at work, sliding carts at work  Dominant Hand:  RIGHT HANDED    Work Status:  EMPLOYED, Radiology [employer]  Patient Awareness:  Patient is aware of HER diagnosis and prognosis.  Social Support/History:  LIVES WITH FAMILY    Objective:    Skin:  skin intact over R shoulder    Palpation:   mild point tenderness over R biceps tendon    Sensation:  Patient  denies numbness/tingling of bilateral UPPER extremities.    ROM:  AROM  Cervical spine AROM WNL's, R shoulder AROM WNL's, L shoulder AROM WNL's, R elbow AROM WNL's, and L elbow AROM WNL's    Strength:    R shoulder 4/5 all planes  L shoulder 5/5 all planes, R elbow 5/5 all planes, and L elbow 5/5 all planes    Special Tests  R shoulder- negative empty can, negative full can, negative Speed's test    Outcome measure   Other Measures  Disability of Arm Shoulder Hand (DASH): 18.18%     Treatment  Time in clinic started at  4:10pm  Time in clinic ended at  4:45pm  Total time in clinic is . 35 minutes  Total timed code time is  30 minutes    Treatment Performed Today:.   PT Initial Evaluation and Self-Care/Home Management HEP  Individual(s) Educated: Patient  Education Provided: Home Exercise Program  Diagnosis and Precautions: S46.011D R rotator cuff strain, subsequent encounter  Risk and Benefits Discussed with Patient/Caregiver/Other: yes  Patient/Caregiver Demonstrated Understanding: yes  Plan of Care Discussed and Agreed Upon: yes  Patient Response to Education: Patient/Caregiver Verbalized Understanding of Information, Patient/Caregiver Performed Return Demonstration of Exercises/Activities    Patient instructed in a home exercise program, has been given handouts for each of the exercises performed and was given another sheet instructing patient in the amount of reps to perform and the lashae to follow while doing the exercises     Access Code: [email or web address]  URL: [email or web address]  Date: 02/22/2024  Prepared by: Galileo Galindo    Exercises  - Prone Single Arm Shoulder Y  - 1 x daily - 4-5 x weekly - 2 sets - 10 reps - 5-10 hold  - Prone Shoulder Horizontal Abduction  - 1 x daily - 4-5 x weekly - 2 sets - 10 reps - 5-10 hold  - Prone Shoulder Extension - Single Arm  - 1 x daily - 4-5 x weekly - 2 sets - 10 reps - 5-10 hold  - Prone Shoulder Row  - 1 x daily - 4-5 x weekly - 2 sets - 10 reps -  5-10 hold  - Prone Shoulder Flexion  - 1 x daily - 4-5 x weekly - 2 sets - 10 reps - 5-10 hold  - Standing Shoulder Flexion with Resistance  - 1 x daily - 4-5 x weekly - 2 sets - 10 reps - 5-10 hold  - Scaption with Resistance  - 1 x daily - 4-5 x weekly - 2 sets - 10 reps - 5-10 hold  - Standing Single Arm Shoulder Abduction with Resistance  - 1 x daily - 4-5 x weekly - 2 sets - 10 reps - 5-10 hold

## 2024-02-22 NOTE — PATIENT INSTRUCTIONS
Access Code: [email or web address]  URL: [email or web address]  Date: 02/22/2024  Prepared by: Galileo Galindo    Exercises  - Prone Single Arm Shoulder Y  - 1 x daily - 4-5 x weekly - 2 sets - 10 reps - 5-10 hold  - Prone Shoulder Horizontal Abduction  - 1 x daily - 4-5 x weekly - 2 sets - 10 reps - 5-10 hold  - Prone Shoulder Extension - Single Arm  - 1 x daily - 4-5 x weekly - 2 sets - 10 reps - 5-10 hold  - Prone Shoulder Row  - 1 x daily - 4-5 x weekly - 2 sets - 10 reps - 5-10 hold  - Prone Shoulder Flexion  - 1 x daily - 4-5 x weekly - 2 sets - 10 reps - 5-10 hold  - Standing Shoulder Flexion with Resistance  - 1 x daily - 4-5 x weekly - 2 sets - 10 reps - 5-10 hold  - Scaption with Resistance  - 1 x daily - 4-5 x weekly - 2 sets - 10 reps - 5-10 hold  - Standing Single Arm Shoulder Abduction with Resistance  - 1 x daily - 4-5 x weekly - 2 sets - 10 reps - 5-10 hold

## 2024-02-26 ENCOUNTER — TREATMENT (OUTPATIENT)
Dept: PHYSICAL THERAPY | Facility: CLINIC | Age: 39
End: 2024-02-26
Payer: COMMERCIAL

## 2024-02-26 DIAGNOSIS — S46.011D STRAIN OF ROTATOR CUFF, RIGHT, SUBSEQUENT ENCOUNTER: Primary | ICD-10-CM

## 2024-02-26 PROCEDURE — 97110 THERAPEUTIC EXERCISES: CPT | Mod: GP | Performed by: PHYSICAL THERAPIST

## 2024-02-26 ASSESSMENT — PAIN - FUNCTIONAL ASSESSMENT: PAIN_FUNCTIONAL_ASSESSMENT: 0-10

## 2024-02-26 ASSESSMENT — PAIN SCALES - GENERAL: PAINLEVEL_OUTOF10: 0 - NO PAIN

## 2024-02-26 NOTE — PROGRESS NOTES
PHYSICAL THERAPY TREATMENT    Patient name:  Nan Thomas    MRN:  [id number]    :  1985    Today's Date:  24    Time Calculation  Start Time: 1458  Stop Time: 1537  Time Calculation (min): 39 min     PT Therapeutic Procedures Time Entry  Therapeutic Exercise Time Entry: 38     Referral by:  Referred By: Dr. Kirit Mittal    Diagnoses:  Diagnosis and Precautions: S46.011D R rotator cuff strain, subsequent encounter    Assessment/Plan:  Therapeutic exercise performed in order to improve R shoulder strength/mobility.  Patient requires increased tactile/verbal cues with exercise in order to reduce R shoulder stress/strain during the particular exercise performed.  Patient challenged with exercises performed in clinic secondary to R shoulder fatigue.   PT Assessment  PT Assessment Results: Decreased strength, Decreased endurance, Pain  Rehab Prognosis: Good  Evaluation/Treatment Tolerance: Patient tolerated treatment well  OP PT Plan  PT Plan: Skilled PT  Rehab Potential: Good  Plan of Care Agreement: Patient    General Visit Information  PT  Visit  PT Received On: 24    General  Reason for Referral: PT Evaluate and Treat  Referred By: Dr. Kirit Mittal  General Comment: S46.011D R rotator cuff strain, subsequent encounter    Insurance  Insurance reviewed  Name of Insurance:  Industrial Employee [facility]  Visit No.  2  RIGHT SHOULDER STRAIN S43.491A; C9 FOR 12V PHYSICAL THERAPY SUBMITTED TO  DISABILITY ON 24 BY SELINA CEDILLO AT  [employer]. // Kalpana confirmed 24 5:37pm   12 visits approved  2024-3/17/2024    Subjective:  Patient reports that her R shoulder is a little after getting off work.    Precautions  STEADI Fall Risk Score (The score of 4 or more indicates an increased risk of falling): 3  Medical Precautions:  (SVT, , corneal Lasik surgery)    Pain:  Pain Assessment  Pain Assessment: 0-10  Pain Score: 0 - No pain  Pain Location: Shoulder  Pain Orientation: Right,  Anterior    Treatments  2:58pm-3:37pm with slight break  Therapeutic Exercise  Therapeutic Exercise Performed: Yes  Therapeutic Exercise Activity 1: UBE 4' FWD/4' BWD 2.0 resistance  Therapeutic Exercise Activity 2: TT rows blue x 20 reps  Therapeutic Exercise Activity 3: TT shoulder extension black x 20 reps  Therapeutic Exercise Activity 4: TT shoulder ER green x 20 reps  Therapeutic Exercise Activity 5: TT shoulder IR blue x 20 reps  Therapeutic Exercise Activity 6: TT shoulder horizontal abduction green x 20 reps  Therapeutic Exercise Activity 7: TT shoulder flexion  Therapeutic Exercise Activity 8: TT shoulder abduction  Therapeutic Exercise Activity 9: supine serratus punches 2 sets, 30 reps, 3 lbs.  Therapeutic Exercise Activity 10: side lying shoulder ER, horizontal abduction, shoulder flexion, shoulder abduction, 2 lbs., x 20 reps each    Treatment  Time in clinic started at:   2:58pm  Time in clinic ended at:   3:37pm  Total time in clinic is:   39 minutes  Total timed code time is:  38 minutes    Treatment Performed Today:.   Therapeutic Exercise x 3 units

## 2024-02-28 ENCOUNTER — PHARMACY VISIT (OUTPATIENT)
Dept: PHARMACY | Facility: CLINIC | Age: 39
End: 2024-02-28
Payer: COMMERCIAL

## 2024-02-28 ENCOUNTER — OFFICE VISIT (OUTPATIENT)
Dept: PRIMARY CARE | Facility: CLINIC | Age: 39
End: 2024-02-28
Payer: COMMERCIAL

## 2024-02-28 VITALS
HEART RATE: 98 BPM | WEIGHT: 262 LBS | SYSTOLIC BLOOD PRESSURE: 136 MMHG | BODY MASS INDEX: 48.21 KG/M2 | DIASTOLIC BLOOD PRESSURE: 78 MMHG | HEIGHT: 62 IN | TEMPERATURE: 97.7 F

## 2024-02-28 DIAGNOSIS — J01.10 ACUTE NON-RECURRENT FRONTAL SINUSITIS: Primary | ICD-10-CM

## 2024-02-28 PROCEDURE — 99214 OFFICE O/P EST MOD 30 MIN: CPT | Performed by: STUDENT IN AN ORGANIZED HEALTH CARE EDUCATION/TRAINING PROGRAM

## 2024-02-28 PROCEDURE — 3008F BODY MASS INDEX DOCD: CPT | Performed by: STUDENT IN AN ORGANIZED HEALTH CARE EDUCATION/TRAINING PROGRAM

## 2024-02-28 PROCEDURE — 1036F TOBACCO NON-USER: CPT | Performed by: STUDENT IN AN ORGANIZED HEALTH CARE EDUCATION/TRAINING PROGRAM

## 2024-02-28 PROCEDURE — RXMED WILLOW AMBULATORY MEDICATION CHARGE

## 2024-02-28 RX ORDER — BENZONATATE 100 MG/1
100 CAPSULE ORAL 3 TIMES DAILY PRN
Qty: 42 CAPSULE | Refills: 0 | Status: SHIPPED | OUTPATIENT
Start: 2024-02-28 | End: 2024-03-29

## 2024-02-28 RX ORDER — FLUTICASONE PROPIONATE 50 MCG
1 SPRAY, SUSPENSION (ML) NASAL DAILY
Qty: 16 G | Refills: 1 | Status: CANCELLED | OUTPATIENT
Start: 2024-02-28 | End: 2025-02-27

## 2024-02-28 RX ORDER — L. ACIDOPH/L. PLANTAR/L. RHAMN 1B CELL
1 CAPSULE,DELAYED RELEASE (ENTERIC COATED) ORAL DAILY
Qty: 30 CAPSULE | Refills: 1 | Status: SHIPPED | OUTPATIENT
Start: 2024-02-28

## 2024-02-28 RX ORDER — AMOXICILLIN AND CLAVULANATE POTASSIUM 875; 125 MG/1; MG/1
875 TABLET, FILM COATED ORAL 2 TIMES DAILY
Qty: 14 TABLET | Refills: 0 | Status: SHIPPED | OUTPATIENT
Start: 2024-02-28 | End: 2024-03-06

## 2024-02-28 ASSESSMENT — PATIENT HEALTH QUESTIONNAIRE - PHQ9
1. LITTLE INTEREST OR PLEASURE IN DOING THINGS: NOT AT ALL
SUM OF ALL RESPONSES TO PHQ9 QUESTIONS 1 AND 2: 0
2. FEELING DOWN, DEPRESSED OR HOPELESS: NOT AT ALL

## 2024-02-28 NOTE — PROGRESS NOTES
"Subjective   Patient ID: Nan Thomas is a 39 y.o. female who presents for Sinusitis (Patient in office today for complaints of a sinus infection x 2 weeks./Patient has tried OTC cold medications with no relief).    2 weeks   Cough, congestion  Was getting better, came back  Denies fever, cp, sob    Plan  Augmentin, probiotic, fluticasone, benzonatate  Conservative treatments  Follow up as needed    Sinusitis         Review of Systems    Objective   /78 (BP Location: Left arm, Patient Position: Sitting, BP Cuff Size: Large adult)   Pulse 98   Temp 36.5 °C (97.7 °F) (Temporal)   Ht 1.575 m (5' 2\")   Wt 119 kg (262 lb)   BMI 47.92 kg/m²     Physical Exam  Vitals reviewed.   Constitutional:       General: She is not in acute distress.     Appearance: Normal appearance. She is not ill-appearing or toxic-appearing.   HENT:      Head: Atraumatic.      Mouth/Throat:      Mouth: Mucous membranes are moist.      Pharynx: Oropharynx is clear.   Eyes:      Extraocular Movements: Extraocular movements intact.      Conjunctiva/sclera: Conjunctivae normal.      Pupils: Pupils are equal, round, and reactive to light.   Cardiovascular:      Rate and Rhythm: Normal rate and regular rhythm.      Pulses: Normal pulses.      Heart sounds: Normal heart sounds. No murmur heard.  Pulmonary:      Effort: Pulmonary effort is normal. No respiratory distress.      Breath sounds: Normal breath sounds.   Abdominal:      General: Abdomen is flat.      Palpations: Abdomen is soft.      Tenderness: There is no abdominal tenderness.   Musculoskeletal:      Right lower leg: No edema.      Left lower leg: No edema.   Skin:     General: Skin is warm and dry.      Capillary Refill: Capillary refill takes less than 2 seconds.      Findings: No rash.   Neurological:      General: No focal deficit present.      Mental Status: She is alert.      Cranial Nerves: No cranial nerve deficit.      Gait: Gait normal.   Psychiatric:         Mood and " Affect: Mood normal.         Behavior: Behavior normal.         Assessment/Plan   Problem List Items Addressed This Visit    None  Visit Diagnoses         Codes    Acute non-recurrent frontal sinusitis    -  Primary J01.10    Relevant Medications    amoxicillin-pot clavulanate (Augmentin) 875-125 mg tablet    L.acidophilus-Bifido.longum (Probiotic Pearls Acidophilus) 1 billion cell capsule,delayed release(DR/EC)    benzonatate (Tessalon) 100 mg capsule

## 2024-03-05 ENCOUNTER — TREATMENT (OUTPATIENT)
Dept: PHYSICAL THERAPY | Facility: CLINIC | Age: 39
End: 2024-03-05
Payer: COMMERCIAL

## 2024-03-05 PROCEDURE — 97110 THERAPEUTIC EXERCISES: CPT | Mod: GP,CQ

## 2024-03-05 ASSESSMENT — PAIN SCALES - GENERAL: PAINLEVEL_OUTOF10: 0 - NO PAIN

## 2024-03-05 ASSESSMENT — PAIN - FUNCTIONAL ASSESSMENT: PAIN_FUNCTIONAL_ASSESSMENT: 0-10

## 2024-03-05 NOTE — PROGRESS NOTES
PHYSICAL THERAPY TREATMENT    Patient name:  Nan Thomas    MRN:  [id number]    :  1985    Today's Date:  24    Time Calculation  Start Time: 0800  Stop Time: 0845  Time Calculation (min): 45 min     PT Therapeutic Procedures Time Entry  Therapeutic Exercise Time Entry: 45    General Comment: S46.011D R rotator cuff strain, subsequent encounter      Referral by:  Referred By: Dr. Kirit Mittal  Diagnosis:   S46.011D R rotator cuff strain, subsequent encounter     Assessment/Plan:  Therapeutic exercise performed in order to improve R shoulder strength/mobility.  Patient requires increased tactile/verbal cues with exercise in order to reduce R shoulder stress/strain during the particular exercise performed.  Patient challenged with exercises performed in clinic secondary to R shoulder fatigue. No complaints of increased pain during or after treatment.   PT Assessment  PT Assessment Results: Decreased strength, Decreased endurance, Pain  OP PT Plan  PT Plan: Skilled PT    General Visit Information       General  Referred By: Dr. Kirit Mittal  General Comment: S46.011D R rotator cuff strain, subsequent encounter    Insurance  Insurance reviewed  Name of Insurance:  Industrial Employee [facility]  Visit No.  3  RIGHT SHOULDER STRAIN S43.491A; C9 FOR 12V PHYSICAL THERAPY SUBMITTED TO  DISABILITY ON 24 BY SELINA CEDILLO AT  [facility]. // Kalpana confirmed 24 5:37pm   12 visits approved  2024-3/17/2024    Subjective:    Pt denies current right shoulder pain. She states that it is tender to touch and is sore with certain movements/activities but once she stops the activity, there is no pain. She continues to report some difficulty sleeping due to discomfort when laying on right side. Pt also reports intermittent tingling in right UE and tightness in right upper trap.   Precautions  STEADI Fall Risk Score (The score of 4 or more indicates an increased risk of falling): 3  Medical Precautions:  (SVT, ,  corneal Lasik surgery)       Precautions  STEADI Fall Risk Score (The score of 4 or more indicates an increased risk of falling): 3  Medical Precautions:  (SVT, , corneal Lasik surgery)  Pain:  Pain Assessment  Pain Assessment: 0-10  Pain Score: 0 - No pain  Pain Location: Shoulder  Pain Orientation: Right      Objective:  Verbal cues to avoid upper trap/levator scap substitution  Treatments          UBE 4' FWD/4' BWD 2.0 resistance  TT rows blue 2 x 10 reps  TT shoulder extension blue 2 x 10 reps  TT shoulder ER green 2 x 10 reps  TT shoulder IR blue 2 x 10 reps  TT shoulder adduction green 2x10  TT shoulder horizontal abduction blue  x 10 reps  supine serratus punches 2 sets,15 reps, 3 lbs.  Supine boxes 3# x 10 ea way  side lying shoulder ER, shoulder flexion, shoulder abduction, 2 lbs., 2 x 10 reps each  Upper trap stretch 30 sec x 2  Levator scap stretch 30 sec x 2   Pec stretch in doorway low and mid 30 sec x 2 ea        Added upper trap and levator scap stretch to HEP    Treatment  Time in clinic started at:  8:00 am  Time in clinic ended at:  8:45 am  Total time in clinic is:  45 minutes  Total timed code time is: 45 minutes    Treatment Performed Today:.   Therapeutic Exercise x 3 units

## 2024-03-07 ENCOUNTER — TREATMENT (OUTPATIENT)
Dept: PHYSICAL THERAPY | Facility: CLINIC | Age: 39
End: 2024-03-07
Payer: COMMERCIAL

## 2024-03-07 DIAGNOSIS — S46.011D STRAIN OF ROTATOR CUFF, RIGHT, SUBSEQUENT ENCOUNTER: Primary | ICD-10-CM

## 2024-03-07 PROCEDURE — 97110 THERAPEUTIC EXERCISES: CPT | Mod: GP | Performed by: PHYSICAL THERAPIST

## 2024-03-07 ASSESSMENT — PAIN - FUNCTIONAL ASSESSMENT: PAIN_FUNCTIONAL_ASSESSMENT: 0-10

## 2024-03-07 NOTE — PROGRESS NOTES
PHYSICAL THERAPY TREATMENT    Patient name:  Nan Thomas    MRN:  [id number]    :  1985    Today's Date:  24    Time Calculation  Start Time: 745  Stop Time: 830  Time Calculation (min): 45 min     PT Therapeutic Procedures Time Entry  Therapeutic Exercise Time Entry: 38     Referral by:  Referred By: Dr. Kirit Mittal    Diagnoses:  Diagnosis and Precautions: S46.011D R rotator cuff strain, subsequent encounter    Assessment/Plan:  Therapeutic exercise performed in order to improve R shoulder strength/mobility.  Patient requires increased tactile/verbal cues with exercise in order to reduce R shoulder stress/strain during the particular exercise performed.  Patient challenged with exercises performed in clinic secondary to R shoulder fatigue.   PT Assessment  PT Assessment Results: Decreased strength, Decreased endurance, Pain  Rehab Prognosis: Good  OP PT Plan  PT Plan: Skilled PT  Rehab Potential: Good  Plan of Care Agreement: Patient    General Visit Information  PT  Visit  PT Received On: 24    General  Reason for Referral: PT Evaluate and Treat  Referred By: Dr. Kirit Mittal  General Comment: S46.011D R rotator cuff strain, subsequent encounter    Insurance  Insurance reviewed  Name of Insurance:  Industrial Employee [facility]  Visit No.  4  RIGHT SHOULDER STRAIN S43.491A; C9 FOR 12V PHYSICAL THERAPY SUBMITTED TO  DISABILITY ON 24 BY SELINA CEDILLO AT  [employer]. // Kalpana confirmed 24 5:37pm   12 visits approved  2024-3/17/2024    Subjective:  Patient reports that her R shoulder feels a little tight this morning.    Precautions  STEADI Fall Risk Score (The score of 4 or more indicates an increased risk of falling): 3  Medical Precautions:  (SVT, , corneal Lasik surgery)    Pain:  Pain Assessment  Pain Assessment: 0-10  Pain Location: Shoulder  Pain Orientation: Right    Treatments  7:45am-8:30am with break  Therapeutic Exercise  Therapeutic Exercise Performed:  "Yes  Therapeutic Exercise Activity 1: UBE 4' FWD/4' BWD 3.0 resistance  Therapeutic Exercise Activity 2: Lat stretch 30\"x5  Therapeutic Exercise Activity 3: swiss ball rhomboid stretch 30\"x4  Therapeutic Exercise Activity 4: TT shoulder ER green x 20 reps  Therapeutic Exercise Activity 5: TT shoulder IR blue x 20 reps  Therapeutic Exercise Activity 6: TT rows blue x 20 reps  Therapeutic Exercise Activity 7: TT shoulder extension black x 20 reps  Therapeutic Exercise Activity 8: TT shoulder flexion green x 20 reps  Therapeutic Exercise Activity 9: TT shoulder abduction green x 20 reps  Therapeutic Exercise Activity 10: side lying shoulder ER, horizontal abduction, shoulder flexion, shoulder abduction, 2 lbs., x 20 reps each    Treatment  Time in clinic started at:   7:45am  Time in clinic ended at:   8:30am  Total time in clinic is:    45 minutes  Total timed code time is:  38 minutes    Treatment Performed Today:.   Therapeutic Exercise x 3 units  "

## 2024-03-12 ENCOUNTER — TELEPHONE (OUTPATIENT)
Dept: PHYSICAL THERAPY | Facility: CLINIC | Age: 39
End: 2024-03-12
Payer: COMMERCIAL

## 2024-03-12 ENCOUNTER — TREATMENT (OUTPATIENT)
Dept: PHYSICAL THERAPY | Facility: CLINIC | Age: 39
End: 2024-03-12
Payer: COMMERCIAL

## 2024-03-12 DIAGNOSIS — S46.011D STRAIN OF ROTATOR CUFF, RIGHT, SUBSEQUENT ENCOUNTER: Primary | ICD-10-CM

## 2024-03-12 PROCEDURE — 97110 THERAPEUTIC EXERCISES: CPT | Mod: GP | Performed by: PHYSICAL THERAPIST

## 2024-03-12 ASSESSMENT — PAIN SCALES - GENERAL: PAINLEVEL_OUTOF10: 0 - NO PAIN

## 2024-03-12 ASSESSMENT — PAIN - FUNCTIONAL ASSESSMENT: PAIN_FUNCTIONAL_ASSESSMENT: 0-10

## 2024-03-12 NOTE — PATIENT INSTRUCTIONS
Access Code: [email or web address]  URL: [email or web address]  Date: 03/12/2024  Prepared by: Galileo Galindo    Exercises  - Shoulder External Rotation with Anchored Resistance  - 1 x daily - 4-5 x weekly - 2 sets - 10 reps - 5-10 hold  - Shoulder Internal Rotation with Resistance  - 1 x daily - 4-5 x weekly - 2 sets - 10 reps - 5-10 hold  - Shoulder Extension with Resistance  - 1 x daily - 4-5 x weekly - 2 sets - 10 reps - 5-10 hold  - Standing Row with Resistance  - 1 x daily - 4-5 x weekly - 2 sets - 10 reps - 5-10 hold  - Sidelying Shoulder ER with Towel and Dumbbell  - 1 x daily - 4-5 x weekly - 2 sets - 10 reps - 5-10 hold  - Wall Push Up  - 1 x daily - 3-4 x weekly - 2 sets - 10 reps - 5-10 hold

## 2024-03-12 NOTE — PROGRESS NOTES
PHYSICAL THERAPY TREATMENT    Patient name:  Nan Thomas    MRN:  [id number]    :  1985    Today's Date:  24    Time Calculation  Start Time: 1648  Stop Time: 1729  Time Calculation (min): 41 min     PT Therapeutic Procedures Time Entry  Therapeutic Exercise Time Entry: 38     Referral by:  Referred By: Dr. Kirit Mittal    Diagnoses:  Diagnosis and Precautions: S46.011D R rotator cuff strain, subsequent encounter    Assessment/Plan:  Therapeutic exercise performed in order to improve R shoulder strength/mobility.  Patient requires increased tactile/verbal cues with exercise in order to reduce R shoulder stress/strain during the particular exercise performed.  Patient challenged with exercises performed in clinic secondary to R shoulder fatigue.  Patient instructed in a home exercise program, has been given handouts for each of the exercises performed and was given another sheet instructing patient in the amount of reps to perform and the lashae to follow while doing the exercises.  PT Assessment  PT Assessment Results: Decreased strength, Decreased endurance, Pain  Rehab Prognosis: Good  Evaluation/Treatment Tolerance: Patient tolerated treatment well  OP PT Plan  PT Plan: Skilled PT  Rehab Potential: Good  Plan of Care Agreement: Patient    General Visit Information  PT  Visit  PT Received On: 24    General  Reason for Referral: PT Evaluate and Treat  Referred By: Dr. Kirit Mittal  General Comment: S46.011D R rotator cuff strain, subsequent encounter    Insurance  Insurance reviewed  Name of Insurance:  Industrial Employee [facility]  Visit No.  5  RIGHT SHOULDER STRAIN S43.491A; C9 FOR 12V PHYSICAL THERAPY SUBMITTED TO  DISABILITY ON 24 BY SELINA CEDILLO AT  [employer]. // Kalpana confirmed 24 5:37pm   12 visits approved  2024-3/17/2024    Subjective:  Patient reports that her R shoulder is feeling pretty good tonight.    Precautions  STEADI Fall Risk Score (The score of 4 or more indicates  "an increased risk of falling): 3  Medical Precautions:  (SVT, , corneal Lasik surgery)    Pain:  Pain Assessment  Pain Assessment: 0-10  Pain Score: 0 - No pain  Pain Location: Shoulder  Pain Orientation: Right    Treatments  4:48pm- 5:29pm with break  Therapeutic Exercise  Therapeutic Exercise Performed: Yes  Therapeutic Exercise Activity 1: UBE 4' FWD/4' BWD 3.0 resistance  Therapeutic Exercise Activity 2: Lat stretch 30\"x5  Therapeutic Exercise Activity 3: swiss ball rhomboid stretch 30\"x4  Therapeutic Exercise Activity 4: TT shoulder ER green x 20 reps  Therapeutic Exercise Activity 5: TT shoulder IR black x 20 reps  Therapeutic Exercise Activity 6: TT rows blue x 20 reps  Therapeutic Exercise Activity 7: TT shoulder extension black x 20 reps  Therapeutic Exercise Activity 8: TT shoulder flexion blue x 20 reps  Therapeutic Exercise Activity 9: TT shoulder abduction blue x 20 reps  Therapeutic Exercise Activity 10: Wall pushup with A plus x 20 reps  Therapeutic Exercise Activity 11: PNF D1 flexion x 20 reps, 3 lbs.  Therapeutic Exercise Activity 12: PNF D2 flexion x 20 reps, 3 lbs.    Patient instructed in a home exercise program, has been given handouts for each of the exercises performed and was given another sheet instructing patient in the amount of reps to perform and the lashae to follow while doing the exercises     Access Code: [email or web address]  URL: [email or web address]  Date: 2024  Prepared by: Galileo Galinod    Exercises  - Shoulder External Rotation with Anchored Resistance  - 1 x daily - 4-5 x weekly - 2 sets - 10 reps - 5-10 hold  - Shoulder Internal Rotation with Resistance  - 1 x daily - 4-5 x weekly - 2 sets - 10 reps - 5-10 hold  - Shoulder Extension with Resistance  - 1 x daily - 4-5 x weekly - 2 sets - 10 reps - 5-10 hold  - Standing Row with Resistance  - 1 x daily - 4-5 x weekly - 2 sets - 10 reps - 5-10 hold  - Sidelying Shoulder ER with Towel and Dumbbell  - 1 " x daily - 4-5 x weekly - 2 sets - 10 reps - 5-10 hold  - Wall Push Up  - 1 x daily - 3-4 x weekly - 2 sets - 10 reps - 5-10 hold    Treatment  Time in clinic started at:   4:48pm  Time in clinic ended at:   5:29pm  Total time in clinic is:     41 minutes  Total timed code time is:  38 minutes    Treatment Performed Today:.   Therapeutic Exercise x 3 units

## 2024-03-14 ENCOUNTER — TREATMENT (OUTPATIENT)
Dept: PHYSICAL THERAPY | Facility: CLINIC | Age: 39
End: 2024-03-14
Payer: COMMERCIAL

## 2024-03-14 DIAGNOSIS — S46.011D STRAIN OF ROTATOR CUFF, RIGHT, SUBSEQUENT ENCOUNTER: Primary | ICD-10-CM

## 2024-03-14 PROCEDURE — 97110 THERAPEUTIC EXERCISES: CPT | Mod: GP | Performed by: PHYSICAL THERAPIST

## 2024-03-14 ASSESSMENT — PAIN - FUNCTIONAL ASSESSMENT: PAIN_FUNCTIONAL_ASSESSMENT: 0-10

## 2024-03-14 ASSESSMENT — PAIN SCALES - GENERAL: PAINLEVEL_OUTOF10: 0 - NO PAIN

## 2024-03-14 NOTE — PROGRESS NOTES
PHYSICAL THERAPY TREATMENT    Patient name:  Nan Thomas    MRN:  [id number]    :  1985    Today's Date:  24    Time Calculation  Start Time: 1530  Stop Time: 1615  Time Calculation (min): 45 min     PT Therapeutic Procedures Time Entry  Therapeutic Exercise Time Entry: 38     Referral by:  Referred By: Dr. Kirit Mittal    Diagnoses:  Diagnosis and Precautions: S46.011D R rotator cuff strain, subsequent encounter    Assessment/Plan:  Therapeutic exercise performed in order to improve R shoulder strength/mobility.  Patient requires increased tactile/verbal cues with exercise in order to reduce R shoulder stress/strain during the particular exercise performed.  Patient challenged with exercises performed in clinic secondary to R shoulder fatigue.    PT Assessment  PT Assessment Results: Decreased strength, Decreased endurance, Pain  Rehab Prognosis: Good  Evaluation/Treatment Tolerance: Patient tolerated treatment well  OP PT Plan  PT Plan: Skilled PT  Rehab Potential: Good  Plan of Care Agreement: Patient    General Visit Information  PT  Visit  PT Received On: 24    General  Reason for Referral: PT Evaluate and Treat  Referred By: Dr. Kirit Mittal  General Comment: S46.011D R rotator cuff strain, subsequent encounter    Insurance  Insurance reviewed  Name of Insurance:  Industrial Employee [facility]  Visit No.    RIGHT SHOULDER STRAIN S43.491A; C9 FOR 12V PHYSICAL THERAPY SUBMITTED TO  DISABILITY ON 24 BY SELINA CEDILLO AT  [employer]. // Kalpana confirmed 24 5:37pm   12 visits approved  2024-4/15/2024    Subjective:  Patient reports that her R shoulder has been clicking at times (no pain however).    Precautions  STEADI Fall Risk Score (The score of 4 or more indicates an increased risk of falling): 3  Medical Precautions:  (SVT, , corneal Lasik surgery)    Pain:  Pain Assessment  Pain Assessment: 0-10  Pain Score: 0 - No pain  Pain Location: Shoulder  Pain Orientation:  "Right    Treatments  3:30pm-4:15pm with break  Therapeutic Exercise  Therapeutic Exercise Performed: Yes  Therapeutic Exercise Activity 1: UBE 4' FWD/4' BWD 3.0 resistance  Therapeutic Exercise Activity 2: Lat stretch 30\"x5  Therapeutic Exercise Activity 3: Jobes 3 lbs., x 30 reps  Therapeutic Exercise Activity 4: TT shoulder ER green x 20 reps  Therapeutic Exercise Activity 5: TT shoulder IR black x 20 reps  Therapeutic Exercise Activity 6: TT rows blue x 20 reps  Therapeutic Exercise Activity 7: TT shoulder extension black x 20 reps  Therapeutic Exercise Activity 8: PNF D2 flexion x 20 reps, 3 lbs.  Therapeutic Exercise Activity 9: PNF D1 flexion x 20 reps, 3 lbs.  Therapeutic Exercise Activity 10: Wall pushup with A plus x 20 reps    Treatment  Time in clinic started at:   3:30pm  Time in clinic ended at:   4:15pm  Total time in clinic is:     45 minutes  Total timed code time is:  38 minutes    Treatment Performed Today:.   Therapeutic Exercise x 3 units  "

## 2024-03-18 ENCOUNTER — TREATMENT (OUTPATIENT)
Dept: PHYSICAL THERAPY | Facility: CLINIC | Age: 39
End: 2024-03-18
Payer: COMMERCIAL

## 2024-03-18 DIAGNOSIS — S46.011D STRAIN OF ROTATOR CUFF, RIGHT, SUBSEQUENT ENCOUNTER: Primary | ICD-10-CM

## 2024-03-18 DIAGNOSIS — S43.491D OTHER SPRAIN OF RIGHT SHOULDER JOINT, SUBSEQUENT ENCOUNTER: ICD-10-CM

## 2024-03-18 PROCEDURE — 97110 THERAPEUTIC EXERCISES: CPT | Mod: GP,CQ

## 2024-03-18 ASSESSMENT — PAIN - FUNCTIONAL ASSESSMENT: PAIN_FUNCTIONAL_ASSESSMENT: 0-10

## 2024-03-18 ASSESSMENT — PAIN SCALES - GENERAL: PAINLEVEL_OUTOF10: 0 - NO PAIN

## 2024-03-18 NOTE — PROGRESS NOTES
PHYSICAL THERAPY TREATMENT    Patient name:  Nan Thomas    MRN:  [id number]    :  1985    Today's Date:  24    Time Calculation  Start Time: 448  Stop Time: 538  Time Calculation (min): 50 min     PT Therapeutic Procedures Time Entry  Manual Therapy Time Entry: 9  Therapeutic Exercise Time Entry: 39     Referral by:  Referred By: Dr. Kirit Mittal    Diagnoses:  Diagnosis and Precautions: S46.011D R rotator cuff strain, subsequent encounter    Assessment/Plan:  Pain/ crepitus w/ D2 ex therefore did manual to increase scapulohumeral rhythm/ decrease crepitus. Therapeutic exercise performed in order to improve R shoulder strength/mobility.  Decreased malalignment/ muscle tightness noted after manual. Pt reports a little increased soreness but decreased crepitus after tx. Patient requires increased tactile/verbal cues with exercise in order to reduce R shoulder stress/strain during the particular exercise performed.  Patient challenged with exercises performed in clinic secondary to R shoulder fatigue.    PT Assessment  Evaluation/Treatment Tolerance: Patient limited by pain  OP PT Plan  PT Plan: Skilled PT    General Visit Information       General  Reason for Referral: PT Evaluate and Treat  Referred By: Dr. Kirit Mittal  General Comment: S46.011D R rotator cuff strain, subsequent encounter    Insurance  Insurance reviewed  Name of Insurance:  Industrial Employee [facility]  Visit No.    RIGHT SHOULDER STRAIN S43.491A; C9 FOR 12V PHYSICAL THERAPY SUBMITTED TO  DISABILITY ON 24 BY SELINA CEDILLO AT  [employer]. // Kalpana confirmed 24 5:37pm   12 visits approved  2024-4/15/2024    Subjective:  Patient reports that her R shoulder has been clicking at times but notes it's not as often. Denies pain. Notes some numbness periodically but not as often lately.     Precautions  STEADI Fall Risk Score (The score of 4 or more indicates an increased risk of falling): 3  Medical Precautions:  (SVT, ,  "corneal Lasik surgery)    Pain:  Pain Assessment  Pain Assessment: 0-10  Pain Score: 0 - No pain    Treatments  Therapeutic Exercise Performed: 4:48-5:10p, 5:20-5:37p  Therapeutic Exercise Activity 1: UBE 4' FWD/4' BWD 3.0 resistance  Therapeutic Exercise Activity 2: Lat stretch 30\"x5  Therapeutic Exercise Activity 3: Jobes 3 lbs., x 30 reps  Therapeutic Exercise Activity 4: TT shoulder ER greenTT 2x15 reps  Therapeutic Exercise Activity 5: TT shoulder IR BLKTT 2x15 reps  Therapeutic Exercise Activity 6: TT rows SilverTT x 20 reps  Therapeutic Exercise Activity 7: TT shoulder extension PurpleTT 2x10 reps  Therapeutic Exercise Activity 8: PNF D2 flexion x  reps, 0 lbs. (Pain/ crunching therefore held w/ TT)  Therapeutic Exercise Activity 9: PNF D1 flexion 2x10 reps, BTT  Therapeutic Exercise Activity 10: Wall pushup with A plus x 20 reps    Manual: 5:10-5:20p     R AC and SC jt mobs (gr3) inf/post. R scapula jt mobs lat and sup. TPR to R lat.    HEP Issued and reviewed written HEP of:    2/22/24:  Exercises  - Prone Single Arm Shoulder Y  - 1 x daily - 4-5 x weekly - 2 sets - 10 reps - 5-10 hold  - Prone Shoulder Horizontal Abduction  - 1 x daily - 4-5 x weekly - 2 sets - 10 reps - 5-10 hold  - Prone Shoulder Extension - Single Arm  - 1 x daily - 4-5 x weekly - 2 sets - 10 reps - 5-10 hold  - Prone Shoulder Row  - 1 x daily - 4-5 x weekly - 2 sets - 10 reps - 5-10 hold  - Prone Shoulder Flexion  - 1 x daily - 4-5 x weekly - 2 sets - 10 reps - 5-10 hold  - Standing Shoulder Flexion with Resistance  - 1 x daily - 4-5 x weekly - 2 sets - 10 reps - 5-10 hold  - Scaption with Resistance  - 1 x daily - 4-5 x weekly - 2 sets - 10 reps - 5-10 hold  - Standing Single Arm Shoulder Abduction with Resistance  - 1 x daily - 4-5 x weekly - 2 sets - 10 reps - 5-10 hold  "

## 2024-03-20 ENCOUNTER — APPOINTMENT (OUTPATIENT)
Dept: PHYSICAL THERAPY | Facility: CLINIC | Age: 39
End: 2024-03-20
Payer: COMMERCIAL

## 2024-03-22 ENCOUNTER — TREATMENT (OUTPATIENT)
Dept: PHYSICAL THERAPY | Facility: CLINIC | Age: 39
End: 2024-03-22
Payer: COMMERCIAL

## 2024-03-22 DIAGNOSIS — S46.011D STRAIN OF ROTATOR CUFF, RIGHT, SUBSEQUENT ENCOUNTER: Primary | ICD-10-CM

## 2024-03-22 PROCEDURE — 97110 THERAPEUTIC EXERCISES: CPT | Mod: GP | Performed by: PHYSICAL THERAPIST

## 2024-03-22 ASSESSMENT — PAIN - FUNCTIONAL ASSESSMENT: PAIN_FUNCTIONAL_ASSESSMENT: 0-10

## 2024-03-22 ASSESSMENT — PAIN SCALES - GENERAL: PAINLEVEL_OUTOF10: 0 - NO PAIN

## 2024-03-22 NOTE — PROGRESS NOTES
PHYSICAL THERAPY TREATMENT    Patient name:  Nan Thomas    MRN:  [id number]    :  1985    Today's Date:  24    Time Calculation  Start Time: 0700  Stop Time: 0743  Time Calculation (min): 43 min     PT Therapeutic Procedures Time Entry  Therapeutic Exercise Time Entry: 38     Referral by:  Referred By: Dr. Kirit Mittal    Diagnoses:  Diagnosis and Precautions: S46.011D R rotator cuff strain, subsequent encounter    Assessment/Plan:  Therapeutic exercise performed in order to improve R shoulder strength/mobility.  Patient requires increased tactile/verbal cues with exercise in order to reduce R shoulder stress/strain during the particular exercise performed.  Patient challenged with exercises performed in clinic secondary to R shoulder fatigue.    PT Assessment  PT Assessment Results: Decreased strength, Decreased endurance, Pain  Rehab Prognosis: Good  Evaluation/Treatment Tolerance: Patient tolerated treatment well  OP PT Plan  PT Plan: Skilled PT  Rehab Potential: Good  Plan of Care Agreement: Patient    General Visit Information  PT  Visit  PT Received On: 24    General  Reason for Referral: PT Evaluate and Treat  Referred By: Dr. Kirit Mittal  General Comment: S46.011D R rotator cuff strain, subsequent encounter    Insurance  Insurance reviewed  Name of Insurance:  Industrial Employee [facility]  Visit No.    RIGHT SHOULDER STRAIN S43.491A; C9 FOR 12V PHYSICAL THERAPY SUBMITTED TO  DISABILITY ON 24 BY SELINA CEDILLO AT  [employer]. // Kalpana confirmed 24 5:37pm   12 visits approved  2024-4/15/2024    Subjective:  Patient reports that her R shoulder just feels a little tight this morning.    Precautions  STEADI Fall Risk Score (The score of 4 or more indicates an increased risk of falling): 3  Medical Precautions:  (SVT, , corneal Lasik surgery)    Pain:  Pain Assessment  Pain Assessment: 0-10  Pain Score: 0 - No pain  Pain Location: Shoulder  Pain Orientation:  "Right    Treatments  7am-7:43am with break  Therapeutic Exercise  Therapeutic Exercise Performed: Yes  Therapeutic Exercise Activity 1: UBE 4' FWD/4' BWD 3.0 resistance  Therapeutic Exercise Activity 2: Lat stretch 30\"x5  Therapeutic Exercise Activity 3: Jobes 3 lbs., x 30 reps  Therapeutic Exercise Activity 4: TT shoulder ER green x 20 reps  Therapeutic Exercise Activity 5: TT shoulder IR black x 20 reps  Therapeutic Exercise Activity 6: TT rows blue x 20 reps  Therapeutic Exercise Activity 7: TT shoulder extension pink x 20 reps  Therapeutic Exercise Activity 8: PNF D1 flexion x 30 reps, 3 lbs.  Therapeutic Exercise Activity 9: Wall pushup with A plus x 20 reps    Cold pack x 10 minutes no charge 7:43am-7:53am    Treatment  Time in clinic started at:   7am  Time in clinic ended at:   7:53am  Total time in clinic is:   53 minutes  Total timed code time is:  38 minutes    Treatment Performed Today:.   Therapeutic Exercise x 3 units  "

## 2024-03-26 ENCOUNTER — TREATMENT (OUTPATIENT)
Dept: PHYSICAL THERAPY | Facility: CLINIC | Age: 39
End: 2024-03-26
Payer: COMMERCIAL

## 2024-03-26 DIAGNOSIS — S46.011D STRAIN OF ROTATOR CUFF, RIGHT, SUBSEQUENT ENCOUNTER: Primary | ICD-10-CM

## 2024-03-26 PROCEDURE — 97110 THERAPEUTIC EXERCISES: CPT | Mod: GP | Performed by: PHYSICAL THERAPIST

## 2024-03-26 ASSESSMENT — PAIN - FUNCTIONAL ASSESSMENT: PAIN_FUNCTIONAL_ASSESSMENT: 0-10

## 2024-03-26 ASSESSMENT — PAIN SCALES - GENERAL: PAINLEVEL_OUTOF10: 0 - NO PAIN

## 2024-03-28 ENCOUNTER — TREATMENT (OUTPATIENT)
Dept: PHYSICAL THERAPY | Facility: CLINIC | Age: 39
End: 2024-03-28
Payer: COMMERCIAL

## 2024-03-28 DIAGNOSIS — S46.011D STRAIN OF ROTATOR CUFF, RIGHT, SUBSEQUENT ENCOUNTER: Primary | ICD-10-CM

## 2024-03-28 PROCEDURE — 97016 VASOPNEUMATIC DEVICE THERAPY: CPT | Mod: GP | Performed by: PHYSICAL THERAPIST

## 2024-03-28 PROCEDURE — 97110 THERAPEUTIC EXERCISES: CPT | Mod: GP | Performed by: PHYSICAL THERAPIST

## 2024-03-28 ASSESSMENT — PAIN - FUNCTIONAL ASSESSMENT: PAIN_FUNCTIONAL_ASSESSMENT: 0-10

## 2024-03-28 ASSESSMENT — PAIN SCALES - GENERAL: PAINLEVEL_OUTOF10: 3

## 2024-03-28 NOTE — PROGRESS NOTES
PHYSICAL THERAPY TREATMENT    Patient name:  Nan Thomas    MRN:  [id number]    :  1985    Today's Date:  24    Time Calculation  Start Time:   Stop Time:   Time Calculation (min): 50 min     PT Therapeutic Procedures Time Entry  Therapeutic Exercise Time Entry: 30  PT Modalities Time Entry  Vasopneumatic Devices Time Entry: 15    Referral by:  Referred By: Dr. Kirit Mittal    Diagnoses:  Diagnosis and Precautions: S46.011D R rotator cuff strain, subsequent encounter    Assessment/Plan:  Therapeutic exercise performed in order to improve R shoulder strength/mobility.  Patient requires increased tactile/verbal cues with exercise in order to reduce R shoulder stress/strain during the particular exercise performed.  Patient challenged with exercises performed in clinic secondary to R shoulder fatigue.    PT Assessment  PT Assessment Results: Decreased strength, Decreased endurance, Pain  Rehab Prognosis: Good  Evaluation/Treatment Tolerance: Patient tolerated treatment well  OP PT Plan  PT Plan: Skilled PT  Rehab Potential: Good  Plan of Care Agreement: Patient  Re-Evaluation next session.    General Visit Information  PT  Visit  PT Received On: 24    General  Reason for Referral: PT Evaluate and Treat  Referred By: Dr. Kirit Mittal  General Comment: S46.011D R rotator cuff strain, subsequent encounter    Insurance  Insurance reviewed  Name of Insurance:  Industrial Employee [facility]  Visit No.    RIGHT SHOULDER STRAIN S43.491A; C9 FOR 12V PHYSICAL THERAPY SUBMITTED TO  DISABILITY ON 24 BY SELINA CEDILLO AT  [employer]. // Kalpana confirmed 24 5:37pm   12 visits approved  2024-4/15/2024    Subjective:  Patient reports that her R shoulder just feels a little sore this afternoon.    Precautions  STEADI Fall Risk Score (The score of 4 or more indicates an increased risk of falling): 3  Medical Precautions:  (SVT, , corneal Lasik surgery)    Pain:  Pain Assessment  Pain  "Assessment: 0-10  Pain Score: 3  Pain Location: Shoulder  Pain Orientation: Right    Treatments  4:58pm-5:28pm  Therapeutic Exercise  Therapeutic Exercise Performed: Yes  Therapeutic Exercise Activity 1: UBE 4' FWD/4' BWD 3.0 resistance  Therapeutic Exercise Activity 2: Lat stretch 30\"x5  Therapeutic Exercise Activity 3: Jobes 3 lbs., x 30 reps  Therapeutic Exercise Activity 4: TT shoulder ER blue x 30 reps  Therapeutic Exercise Activity 5: TT shoulder IR black x 20 reps  Therapeutic Exercise Activity 6: TT rows blue x 20 reps  Therapeutic Exercise Activity 7: TT shoulder extension pink x 20 reps  Therapeutic Exercise Activity 8: Wall pushup with A plus x 20 reps    5:33pm-5:48pm  Game Ready x 15 minutes R shoulder    Treatment  Time in clinic started at:   4:58pm  Time in clinic ended at:   5:48pm  Total time in clinic is:     50 minutes  Total timed code time is:  45 minutes    Treatment Performed Today:.   Therapeutic Exercise x 2 units  Vasopneumatic compression x 1 unit  "

## 2024-04-01 ENCOUNTER — TREATMENT (OUTPATIENT)
Dept: PHYSICAL THERAPY | Facility: CLINIC | Age: 39
End: 2024-04-01
Payer: COMMERCIAL

## 2024-04-01 DIAGNOSIS — S46.011D STRAIN OF ROTATOR CUFF, RIGHT, SUBSEQUENT ENCOUNTER: Primary | ICD-10-CM

## 2024-04-01 PROCEDURE — 97110 THERAPEUTIC EXERCISES: CPT | Mod: GP | Performed by: PHYSICAL THERAPIST

## 2024-04-01 ASSESSMENT — PAIN - FUNCTIONAL ASSESSMENT: PAIN_FUNCTIONAL_ASSESSMENT: 0-10

## 2024-04-01 ASSESSMENT — PAIN SCALES - GENERAL: PAINLEVEL_OUTOF10: 0 - NO PAIN

## 2024-04-01 NOTE — PROGRESS NOTES
PHYSICAL THERAPY TREATMENT    Patient name:  Nan Thomas    MRN:  [id number]    :  1985    Today's Date:  24    Time Calculation  Start Time:   Stop Time: 1710  Time Calculation (min): 23 min     PT Therapeutic Procedures Time Entry  Therapeutic Exercise Time Entry: 23     Referral by:  Referred By: Dr. Kirit Mittal    Diagnoses:  Diagnosis and Precautions: S46.011D R rotator cuff strain, subsequent encounter    Goals:   By the end of 12 visits patient will be able to do the following with < 0/10 R SHOULDER pain:    Basic ADL's:  Patient will perform bADL's/instrumental ADL's for 30-60 minutes moving between various UE reaching/lifting/carrying postures.  MET    HEP:  Patient will consistently perform HER home exercise program for 20-30 minute sessions, 1-2x/day, 3-4 days/week independently.  MET    ROM and Strength:  Patient will demonstrate 5/5 R SHOULDER strength in all planes to improve their ability to lift, reach, perform basic ADL's.  MET    WORK:  Patient will return to WORK and perform normal WORK activities without R shoulder pain.  MET    Sleep:  Patient will sleep thru the night 4/7 nights/week.  MET    Participation restrictions:  Decrease Quick DASH to < or = to 0% disability for increased functional ability.  ALMOST MET    Pain:  Decrease pain at worst to < or = to 0/10 for improved QOL and ability to sleep.  MET    No point tenderness noted over the R anterior shoulder.  MET    Assessment/Plan:  Patient comes into clinic today for PT Re-Evaluation secondary to complaints of R shoulder pain.  Patient demonstrates very good progress with regards to her R shoulder rehab thus far.  Patient has met nearly all of her PT goals, is independent with her HEP, and will be discharged from PT at this time.  PT Assessment  PT Assessment Results: Decreased strength  Rehab Prognosis: Good  Evaluation/Treatment Tolerance: Patient tolerated treatment well  OP PT Plan  PT Plan: No Additional PT  interventions required at this time  PT Frequency: Other (Comment) (Discharge)  Duration: DC  Rehab Potential: Good  Plan of Care Agreement: Patient    General Visit Information  PT  Visit  PT Received On: 24    General  Reason for Referral: PT Evaluate and Treat  Referred By: Dr. Kirit Mittal  General Comment: S46.011D R rotator cuff strain, subsequent encounter    Insurance  Insurance reviewed  Name of Insurance:  [employer] [facility]  Visit No.    RIGHT SHOULDER STRAIN S43.491A; C9 FOR 12V PHYSICAL THERAPY SUBMITTED TO  DISABILITY ON 24 BY SELINA CEDILLO AT  [employer]. // Kalpana confirmed 24 5:37pm   12 visits approved  2024-4/15/2024    Subjective:  Patient comes into clinic today for PT Re-Evaluation secondary to complaints of R shoulder pain.  Patient reports she follows up with MD on 2024 and that her R shoulder feels 80% improved since the injury.    Precautions  STEADI Fall Risk Score (The score of 4 or more indicates an increased risk of falling): 3  Medical Precautions:  (SVT, , corneal Lasik surgery)    Pain:  Pain Assessment  Pain Assessment: 0-10  Pain Score: 0 - No pain  Pain Location: Shoulder  Pain Orientation: Right    Objective:    Skin:  skin intact over R shoulder    Palpation:   no point tenderness over R biceps tendon    Sensation:  Patient denies numbness/tingling of bilateral UPPER extremities.    ROM:  AROM  R shoulder WNL's all planes    Strength:    R shoulder 5/5 all planes  R elbow 5/5 all planes    Special Tests  R shoulder- negative empty can, negative full can, negative Speed's test    Outcome measure  Other Measures  Disability of Arm Shoulder Hand (DASH): 6.82%    Treatments  4:47pm-5:10pm  Therapeutic Exercise  Therapeutic Exercise Performed: Yes  Therapeutic Exercise Activity 1: UBE 4' FWD/4' BWD 3.0 resistance  Therapeutic Exercise Activity 2: Re-Evaluation  Therapeutic Exercise Activity 3: Issued new HEP    Patient instructed in a home  exercise program, has been given handouts for each of the exercises performed and was given another sheet instructing patient in the amount of reps to perform and the lashae to follow while doing the exercises     Access Code: [email or web address]  URL: [email or web address]  Date: 04/01/2024  Prepared by: Galileo Galindo    Exercises  - Prone Single Arm Shoulder Y  - 1 x daily - 4-5 x weekly - 2 sets - 10 reps - 5-10 hold  - Prone Shoulder Horizontal Abduction  - 1 x daily - 4-5 x weekly - 2 sets - 10 reps - 5-10 hold  - Prone Shoulder Flexion  - 1 x daily - 4-5 x weekly - 2 sets - 10 reps - 5-10 hold  - Sidelying Shoulder ER with Towel and Dumbbell  - 1 x daily - 4-5 x weekly - 2 sets - 10 reps - 5-10 hold  - Sidelying Shoulder Abduction Palm Forward  - 1 x daily - 4-5 x weekly - 2 sets - 10 reps - 5-10 hold  - Sidelying Shoulder Flexion 15 Degrees  - 1 x daily - 4-5 x weekly - 2 sets - 10 reps - 5-10 hold  - Sidelying Shoulder Horizontal Abduction  - 1 x daily - 4-5 x weekly - 2 sets - 10 reps - 5-10 hold    Treatment  Time in clinic started at:   4:47pm  Time in clinic ended at:   5:10pm  Total time in clinic is:     23 minutes  Total timed code time is:  23 minutes    Treatment Performed Today:.   Therapeutic Exercise x 2 units

## 2024-04-01 NOTE — PATIENT INSTRUCTIONS
Access Code: [email or web address]  URL: [email or web address]  Date: 04/01/2024  Prepared by: Galileo Galindo    Exercises  - Prone Single Arm Shoulder Y  - 1 x daily - 4-5 x weekly - 2 sets - 10 reps - 5-10 hold  - Prone Shoulder Horizontal Abduction  - 1 x daily - 4-5 x weekly - 2 sets - 10 reps - 5-10 hold  - Prone Shoulder Flexion  - 1 x daily - 4-5 x weekly - 2 sets - 10 reps - 5-10 hold  - Sidelying Shoulder ER with Towel and Dumbbell  - 1 x daily - 4-5 x weekly - 2 sets - 10 reps - 5-10 hold  - Sidelying Shoulder Abduction Palm Forward  - 1 x daily - 4-5 x weekly - 2 sets - 10 reps - 5-10 hold  - Sidelying Shoulder Flexion 15 Degrees  - 1 x daily - 4-5 x weekly - 2 sets - 10 reps - 5-10 hold  - Sidelying Shoulder Horizontal Abduction  - 1 x daily - 4-5 x weekly - 2 sets - 10 reps - 5-10 hold

## 2024-04-04 ENCOUNTER — APPOINTMENT (OUTPATIENT)
Dept: PHYSICAL THERAPY | Facility: CLINIC | Age: 39
End: 2024-04-04
Payer: COMMERCIAL

## 2024-08-16 ENCOUNTER — OFFICE VISIT (OUTPATIENT)
Dept: PRIMARY CARE | Facility: CLINIC | Age: 39
End: 2024-08-16
Payer: COMMERCIAL

## 2024-08-16 ENCOUNTER — PHARMACY VISIT (OUTPATIENT)
Dept: PHARMACY | Facility: CLINIC | Age: 39
End: 2024-08-16
Payer: COMMERCIAL

## 2024-08-16 VITALS
HEIGHT: 62 IN | DIASTOLIC BLOOD PRESSURE: 88 MMHG | OXYGEN SATURATION: 98 % | SYSTOLIC BLOOD PRESSURE: 122 MMHG | HEART RATE: 120 BPM | TEMPERATURE: 99 F | WEIGHT: 257 LBS | RESPIRATION RATE: 16 BRPM | BODY MASS INDEX: 47.29 KG/M2

## 2024-08-16 DIAGNOSIS — E66.01 CLASS 3 SEVERE OBESITY WITH BODY MASS INDEX (BMI) OF 40.0 TO 44.9 IN ADULT, UNSPECIFIED OBESITY TYPE, UNSPECIFIED WHETHER SERIOUS COMORBIDITY PRESENT (MULTI): ICD-10-CM

## 2024-08-16 DIAGNOSIS — R06.2 WHEEZING: ICD-10-CM

## 2024-08-16 DIAGNOSIS — R05.1 ACUTE COUGH: ICD-10-CM

## 2024-08-16 DIAGNOSIS — J40 BRONCHITIS: Primary | ICD-10-CM

## 2024-08-16 PROCEDURE — RXMED WILLOW AMBULATORY MEDICATION CHARGE

## 2024-08-16 PROCEDURE — 99213 OFFICE O/P EST LOW 20 MIN: CPT | Performed by: NURSE PRACTITIONER

## 2024-08-16 PROCEDURE — 1036F TOBACCO NON-USER: CPT | Performed by: NURSE PRACTITIONER

## 2024-08-16 PROCEDURE — 3008F BODY MASS INDEX DOCD: CPT | Performed by: NURSE PRACTITIONER

## 2024-08-16 RX ORDER — PREDNISONE 10 MG/1
TABLET ORAL
Qty: 20 TABLET | Refills: 0 | Status: SHIPPED | OUTPATIENT
Start: 2024-08-16 | End: 2024-08-24

## 2024-08-16 RX ORDER — BENZONATATE 200 MG/1
200 CAPSULE ORAL 3 TIMES DAILY PRN
Qty: 42 CAPSULE | Refills: 0 | Status: SHIPPED | OUTPATIENT
Start: 2024-08-16 | End: 2024-09-15

## 2024-08-16 RX ORDER — CEFDINIR 300 MG/1
300 CAPSULE ORAL 2 TIMES DAILY
Qty: 20 CAPSULE | Refills: 0 | Status: SHIPPED | OUTPATIENT
Start: 2024-08-16 | End: 2024-08-26

## 2024-08-16 ASSESSMENT — ENCOUNTER SYMPTOMS
SHORTNESS OF BREATH: 0
ABDOMINAL PAIN: 0
FEVER: 0
NAUSEA: 0
WEAKNESS: 0
CONSTIPATION: 0
OCCASIONAL FEELINGS OF UNSTEADINESS: 0
COUGH: 1
DIZZINESS: 0
DIARRHEA: 0
LOSS OF SENSATION IN FEET: 0
VOMITING: 0
SORE THROAT: 0
CHILLS: 0
DEPRESSION: 0
SINUS PAIN: 0
PALPITATIONS: 0
HEADACHES: 0
CHEST TIGHTNESS: 1
WHEEZING: 1

## 2024-08-16 NOTE — PROGRESS NOTES
"Subjective   Patient ID: Nan Thomas is a 39 y.o. female who presents for Sinusitis (Patient presents in office with a complaint of sinusitis, wheezing and productive cough. Patient has been experiencing this for about one week. Patient has tried otc medication which provided minimal relief. ).    HPI     39-year-old female presents today complaining of chest congestion, cough and wheezing that has been persisting for more than a week.  She denies any fever or chills.  She denies any chest pain or trouble breathing, but states that her chest does feel tight.  She denies smoking or vaping.  No history of asthma.  She denies any ill contacts.  She has been taking benzonatate, Delsym, Mucinex, Flonase and Sudafed with little relief.    Review of Systems   Constitutional:  Negative for chills and fever.   HENT:  Positive for congestion. Negative for ear pain, sinus pain and sore throat.    Respiratory:  Positive for cough, chest tightness and wheezing. Negative for shortness of breath.    Cardiovascular:  Negative for chest pain and palpitations.   Gastrointestinal:  Negative for abdominal pain, constipation, diarrhea, nausea and vomiting.   Skin:  Negative for rash.   Neurological:  Negative for dizziness, weakness and headaches.       Objective   /88 (BP Location: Left arm, Patient Position: Sitting, BP Cuff Size: Large adult)   Pulse (!) 120   Temp 37.2 °C (99 °F) (Oral)   Resp 16   Ht 1.575 m (5' 2\")   Wt 117 kg (257 lb)   SpO2 98%   BMI 47.01 kg/m²     Physical Exam  Vitals and nursing note reviewed.   Constitutional:       General: She is not in acute distress.     Appearance: Normal appearance.   HENT:      Right Ear: Tympanic membrane normal.      Left Ear: Tympanic membrane normal.      Nose: No congestion.      Mouth/Throat:      Pharynx: No oropharyngeal exudate or posterior oropharyngeal erythema.   Eyes:      Conjunctiva/sclera: Conjunctivae normal.   Cardiovascular:      Rate and Rhythm: " Regular rhythm. Tachycardia present.      Heart sounds: Normal heart sounds.   Pulmonary:      Effort: Pulmonary effort is normal.      Breath sounds: Wheezing present. No rhonchi or rales.   Lymphadenopathy:      Cervical: No cervical adenopathy.   Skin:     General: Skin is warm and dry.   Neurological:      Mental Status: She is alert.   Psychiatric:         Mood and Affect: Mood normal.         Behavior: Behavior normal.         Assessment/Plan   Problem List Items Addressed This Visit    None  Visit Diagnoses         Codes    Bronchitis    -  Primary J40    Relevant Medications    cefdinir (Omnicef) 300 mg capsule    predniSONE (Deltasone) 10 mg tablet    benzonatate (Tessalon) 200 mg capsule    Acute cough     R05.1    Wheezing     R06.2    Class 3 severe obesity with body mass index (BMI) of 40.0 to 44.9 in adult, unspecified obesity type, unspecified whether serious comorbidity present (Multi)     E66.01, Z68.41        Bronchitis: Will treat with Omnicef, prednisone and benzonatate as needed. Encouraged monitoring heart rate while take prednisone.  Increase rest and fluids.  Follow-up with PCP in 1 week with any persisting symptoms, or sooner with any additional concerns.  If developing any worsening symptoms, chest pain, trouble breathing, confusion or dizziness, proceed to emergency department or call 911.

## 2024-11-19 ENCOUNTER — OFFICE VISIT (OUTPATIENT)
Dept: OBSTETRICS AND GYNECOLOGY | Facility: CLINIC | Age: 39
End: 2024-11-19
Payer: COMMERCIAL

## 2024-11-19 VITALS
WEIGHT: 242.6 LBS | BODY MASS INDEX: 44.64 KG/M2 | HEIGHT: 62 IN | SYSTOLIC BLOOD PRESSURE: 134 MMHG | DIASTOLIC BLOOD PRESSURE: 85 MMHG

## 2024-11-19 DIAGNOSIS — N76.4 LABIAL ABSCESS: Primary | ICD-10-CM

## 2024-11-19 PROCEDURE — 1036F TOBACCO NON-USER: CPT | Performed by: ADVANCED PRACTICE MIDWIFE

## 2024-11-19 PROCEDURE — 3008F BODY MASS INDEX DOCD: CPT | Performed by: ADVANCED PRACTICE MIDWIFE

## 2024-11-19 PROCEDURE — 99213 OFFICE O/P EST LOW 20 MIN: CPT | Performed by: ADVANCED PRACTICE MIDWIFE

## 2024-11-19 NOTE — PROGRESS NOTES
"Assessment/Plan   Nan was seen today for cystitis.  Diagnoses and all orders for this visit:  Labial abscess  Draining spontaneously without evidence of cellulitis  Continue sitz baths  Monitor for worsening symptoms or erythema    Follow up PRN    Subjective   Nan Thomas is a 39 y.o. female who presents for cyst on labia, popped some earlier today.  Onset: Saturday it got angry  Home therapies: witch hazel and sitz baths    Menstrual History:  OB History    No obstetric history on file.       No LMP recorded. (Menstrual status: IUD).         ROS as in HPI    Objective   /85 (BP Location: Right arm, Patient Position: Sitting)   Ht 1.575 m (5' 2\")   Wt 110 kg (242 lb 9.6 oz)   BMI 44.37 kg/m²     Physical Exam  Pulmonary:      Effort: Pulmonary effort is normal.   Abdominal:      Tenderness: There is no abdominal tenderness.   Genitourinary:         Comments: Grape sized mass draining light yellow purulent discharge and blood, able to completely collapse with light pressure. Surrounding skin without erythema.  Neurological:      Mental Status: She is alert.             "

## 2024-11-21 ENCOUNTER — APPOINTMENT (OUTPATIENT)
Dept: OBSTETRICS AND GYNECOLOGY | Facility: CLINIC | Age: 39
End: 2024-11-21
Payer: COMMERCIAL

## 2025-01-21 ENCOUNTER — OFFICE VISIT (OUTPATIENT)
Dept: PRIMARY CARE | Facility: CLINIC | Age: 40
End: 2025-01-21
Payer: COMMERCIAL

## 2025-01-21 ENCOUNTER — PHARMACY VISIT (OUTPATIENT)
Dept: PHARMACY | Facility: CLINIC | Age: 40
End: 2025-01-21
Payer: COMMERCIAL

## 2025-01-21 VITALS
RESPIRATION RATE: 16 BRPM | TEMPERATURE: 98.1 F | BODY MASS INDEX: 44.16 KG/M2 | DIASTOLIC BLOOD PRESSURE: 70 MMHG | SYSTOLIC BLOOD PRESSURE: 124 MMHG | HEART RATE: 103 BPM | HEIGHT: 62 IN | WEIGHT: 240 LBS | OXYGEN SATURATION: 99 %

## 2025-01-21 DIAGNOSIS — J02.9 SORE THROAT: ICD-10-CM

## 2025-01-21 DIAGNOSIS — R13.10 PAINFUL SWALLOWING: ICD-10-CM

## 2025-01-21 DIAGNOSIS — J02.9 TONSILLOPHARYNGITIS: Primary | ICD-10-CM

## 2025-01-21 LAB — POC RAPID STREP: NEGATIVE

## 2025-01-21 PROCEDURE — 87081 CULTURE SCREEN ONLY: CPT

## 2025-01-21 PROCEDURE — 87880 STREP A ASSAY W/OPTIC: CPT | Performed by: NURSE PRACTITIONER

## 2025-01-21 PROCEDURE — 99212 OFFICE O/P EST SF 10 MIN: CPT | Performed by: NURSE PRACTITIONER

## 2025-01-21 PROCEDURE — RXMED WILLOW AMBULATORY MEDICATION CHARGE

## 2025-01-21 RX ORDER — AMOXICILLIN 875 MG/1
875 TABLET, FILM COATED ORAL 2 TIMES DAILY
Qty: 14 TABLET | Refills: 0 | Status: SHIPPED | OUTPATIENT
Start: 2025-01-21 | End: 2025-01-28

## 2025-01-21 ASSESSMENT — ENCOUNTER SYMPTOMS
DEPRESSION: 0
OCCASIONAL FEELINGS OF UNSTEADINESS: 0
LOSS OF SENSATION IN FEET: 0

## 2025-01-21 ASSESSMENT — PATIENT HEALTH QUESTIONNAIRE - PHQ9
SUM OF ALL RESPONSES TO PHQ9 QUESTIONS 1 AND 2: 0
2. FEELING DOWN, DEPRESSED OR HOPELESS: NOT AT ALL
2. FEELING DOWN, DEPRESSED OR HOPELESS: NOT AT ALL
1. LITTLE INTEREST OR PLEASURE IN DOING THINGS: NOT AT ALL
SUM OF ALL RESPONSES TO PHQ9 QUESTIONS 1 AND 2: 0
1. LITTLE INTEREST OR PLEASURE IN DOING THINGS: NOT AT ALL

## 2025-01-21 ASSESSMENT — PAIN SCALES - GENERAL: PAINLEVEL_OUTOF10: 7

## 2025-01-21 NOTE — PROGRESS NOTES
Subjective   Patient ID: Nan Thomas is a 39 y.o. female who is with chief complaint of sore throat.    HPI   Patient is a 39 y.o. female who CONSULTED AT [facility] today. Patient is with complaints of sore throat, painful swallowing, red swollen tonsils, tender swollen glands (lymph nodes) on the anterior neck area, and fatigue. She denies having any nasal congestion, nasal discharge, headache, sinus pain, cough, muscle ache, loss of sense of taste, loss of sense of smell, diarrhea, chills nor fever. Patient states that present condition started about 3 days ago. Patient denies history of recent travel, exposure to person/people who tested positive for COVID 19, nor exposure to person/people with flu like symptoms. she denies shortness of breath, chest pain, palpitations, nor edema. she stated that she  tried OTC medications which afforded only slight relief of symptoms. she denies nausea, vomiting, abdominal pain, nor any other symptoms.    Patient states she had her COVID vaccine.  Patient states she had the flu shot for this season.    Review of Systems  General: no weight loss, generally healthy, (+) fatigue  Head:  no headaches / sinus pain, no vertigo, no injury  Eyes: no diplopia, no tearing, no pain,   Ears: no change in hearing, no tinnitus, no bleeding, no vertigo  Mouth:  no dental difficulties, no gingival bleeding, (+) sore throat, no loss of sense of taste, (+) painful swallowing, (+) red swollen tonsils,   Nose: no congestion, no  discharge, no bleeding, no obstruction, no loss of sense of smell  Neck: (+) tender swollen glands (lymph nodes) on the anterior neck area, no stiffness, no bruit  Pulmonary: no dyspnea, no wheezing, no hemoptysis, no cough  Cardiovascular: no chest pain, no palpitations, no syncope, no orthopnea  Gastrointestinal: no change in appetite, no dysphagia, no abdominal pains, no diarrhea, no emesis, no melena  Genito Urinary: no dysuria, no urinary  "urgency, no nocturia, no incontinence, no change in nature of urine  Musculoskeletal: no muscle ache, no joint pain, no limitation of range of motion, no paresthesia, no numbness  Constitutional: no fever, no chills, no night sweats    Objective   /70   Pulse 103   Temp 36.7 °C (98.1 °F)   Resp 16   Ht 1.575 m (5' 2\")   Wt 109 kg (240 lb)   SpO2 99%   BMI 43.90 kg/m²     Physical Exam  General: ambulatory, in no acute distress  Head: normocephalic, no lesions, no sinus tenderness  Eyes: pink palpebral conjunctiva, anicteric sclerae, PERRLA, EOM's full  Ears: clear external auditory canals, no ear discharge, no bleeding from the ears, tympanic membrane intact  Nose: no congestion of nasal mucosa, no nasal discharge, no bleeding, no obstruction  Throat: (+) erythema, and (+) exudate on posterior pharyngeal wall, no lesion, (+) erythema and enlargement of both tonsils  Neck: supple, (+) tender swollen glands (lymph nodes) on the anterior neck area  Chest: symmetrical chest expansion, no lagging, no retractions, clear breath sounds, no rales, no wheezes  Extremities: full and equal peripheral pulses, no edema,    Assessment/Plan   Problem List Items Addressed This Visit    None  Visit Diagnoses         Codes    Tonsillopharyngitis    -  Primary J02.9    Relevant Medications    amoxicillin (Amoxil) 875 mg tablet    Other Relevant Orders    POCT rapid strep A manually resulted (Completed)    Group A Streptococcus, Culture    Sore throat     J02.9    Relevant Medications    amoxicillin (Amoxil) 875 mg tablet    Other Relevant Orders    POCT rapid strep A manually resulted (Completed)    Group A Streptococcus, Culture    Painful swallowing     R13.10    Relevant Medications    amoxicillin (Amoxil) 875 mg tablet    Other Relevant Orders    POCT rapid strep A manually resulted (Completed)    Group A Streptococcus, Culture        rapid strep test done  negative result discussed and explained to the " patient  culture and sensitivity study of throat swab ordered and done  will call patient with result    DISCHARGE SUMMARY:   Patient was seen and examined. Diagnosis, treatment, treatment options, and possible complications of today's illness discussed and explained to patient. Patient to take medication/s associated with this visit. Patient may also take OTC analgesic/ antipyretic if needed for pain/fever. Advised to increase oral fluid intake. Advised Listerine antiseptic mouthwash gargle TID. Patient may use Cepacol oral spray as needed to relieve throat discomfort. Patient was advised to discard the old toothbrush and use a new toothbrush beginning on the third of antibiotics. Advised to come back if with worsening or persistent symptoms. Patient verbalized understanding of plan of care.    Patient to come back in 7 - 10 days if needed for worsening symptoms.

## 2025-01-21 NOTE — PATIENT INSTRUCTIONS
DISCHARGE SUMMARY:   Patient was seen and examined. Diagnosis, treatment, treatment options, and possible complications of today's illness discussed and explained to patient. Patient to take medication/s associated with this visit. Patient may also take OTC analgesic/ antipyretic if needed for pain/fever. Advised to increase oral fluid intake. Advised Listerine antiseptic mouthwash gargle TID. Patient may use Cepacol oral spray as needed to relieve throat discomfort. Patient was advised to discard the old toothbrush and use a new toothbrush beginning on the third of antibiotics. Advised to come back if with worsening or persistent symptoms. Patient verbalized understanding of plan of care.    Patient to come back in 7 - 10 days if needed for worsening symptoms.

## 2025-01-21 NOTE — PROGRESS NOTES
"Subjective   Patient ID: Nan Thomas is a 39 y.o. female who presents for sore throat      Symptoms:sore throat, plugged  ear sensation, swollen glands.  Length of symptoms: 2 days ago  OTC: ibuprofen , teas honey with no help.  Related information:   HPI     Review of Systems    Objective   /70   Pulse 103   Temp 36.7 °C (98.1 °F)   Resp 16   Ht 1.575 m (5' 2\")   Wt 109 kg (240 lb)   SpO2 99%   BMI 43.90 kg/m²     Physical Exam    Assessment/Plan          "

## 2025-01-24 ENCOUNTER — DOCUMENTATION (OUTPATIENT)
Dept: PRIMARY CARE | Facility: CLINIC | Age: 40
End: 2025-01-24
Payer: COMMERCIAL

## 2025-01-24 LAB — S PYO THROAT QL CULT: NORMAL

## 2025-02-11 ENCOUNTER — APPOINTMENT (OUTPATIENT)
Dept: PRIMARY CARE | Facility: CLINIC | Age: 40
End: 2025-02-11
Payer: COMMERCIAL

## 2025-02-20 ENCOUNTER — APPOINTMENT (OUTPATIENT)
Dept: PRIMARY CARE | Facility: CLINIC | Age: 40
End: 2025-02-20
Payer: COMMERCIAL

## 2025-02-20 VITALS
BODY MASS INDEX: 42.21 KG/M2 | DIASTOLIC BLOOD PRESSURE: 82 MMHG | WEIGHT: 238.2 LBS | OXYGEN SATURATION: 97 % | HEART RATE: 120 BPM | HEIGHT: 63 IN | SYSTOLIC BLOOD PRESSURE: 132 MMHG | TEMPERATURE: 98.5 F

## 2025-02-20 DIAGNOSIS — E55.9 INADEQUATE INTAKE OF CALCIUM AND VITAMIN D: ICD-10-CM

## 2025-02-20 DIAGNOSIS — E58 INADEQUATE INTAKE OF CALCIUM AND VITAMIN D: ICD-10-CM

## 2025-02-20 DIAGNOSIS — Z13.89 SCREENING FOR BLOOD OR PROTEIN IN URINE: ICD-10-CM

## 2025-02-20 DIAGNOSIS — I47.11 INAPPROPRIATE SINUS TACHYCARDIA (CMS-HCC): ICD-10-CM

## 2025-02-20 DIAGNOSIS — Z12.31 SCREENING MAMMOGRAM FOR BREAST CANCER: ICD-10-CM

## 2025-02-20 DIAGNOSIS — Z00.00 ANNUAL PHYSICAL EXAM: Primary | ICD-10-CM

## 2025-02-20 DIAGNOSIS — Z13.220 SCREENING FOR LIPID DISORDERS: ICD-10-CM

## 2025-02-20 DIAGNOSIS — Z13.29 SCREENING FOR THYROID DISORDER: ICD-10-CM

## 2025-02-20 PROCEDURE — 1036F TOBACCO NON-USER: CPT | Performed by: INTERNAL MEDICINE

## 2025-02-20 PROCEDURE — 3008F BODY MASS INDEX DOCD: CPT | Performed by: INTERNAL MEDICINE

## 2025-02-20 PROCEDURE — 99396 PREV VISIT EST AGE 40-64: CPT | Performed by: INTERNAL MEDICINE

## 2025-02-20 NOTE — PROGRESS NOTES
Nan Thomas, pleasant 40 y.o. female was seen today:   Chief Complaint   Patient presents with    Annual Exam     Patient is here today for their annual exam. Patient is also requesting to have their routine screenings, labs, mammogram, etc       VISIT DEBBIY:  Mikayla Thomas presents today for her annual physical.  She has no acute complaints at this time.  She requests routine screening exams including a mammogram as well as a coronary artery calcium score to be completed, as well as her annual blood work.  She is not on any medications at this time aside from a multivitamin and vitamin D supplementation.  She has begun taking melatonin for sleep, which seems to be working well.  Otherwise no issues at this time. She does have a history of SVT, previously on diltiazem.  She denies any significant palpitations right now.  She no longer is on this medication, and does monitor her heart rate with her Fitbit.  No issues at this time.  Prior history of smoking, quit 15 years ago.     She does endorse recent GI illness that began on Tuesday.  She has been having some diarrhea but no real nausea or vomiting.  She reports that this is getting better.  Her blood pressure was slightly elevated today and she was tachycardic, which could be related to her GI illness.  No fevers or chills.  No headache or blurry vision.  No urinary symptoms at this time.  No chest pain or shortness of breath.    Vital signs reviewed and physical exam performed.  Most recent lab work from 2023 reviewed.  No medications at this time.  We will order her annual lab work to be done, as well as a screening mammogram and CAC scoring. We will call with the results of the studies, and see her back in 1 year for her annual physical.           MEDICATIONS:   Current Outpatient Medications   Medication Instructions    cholecalciferol (VITAMIN D-3) 2,000 Units, Daily RT    multivitamin with minerals-folic acid-vitamin k-Co Q10 (Aquadeks) 100-350-5  "mcg-mcg-mg chewable tablet 1 tablet, Daily         TODAY'S VISIT  DX:     1. Annual physical exam        2. Inappropriate sinus tachycardia (CMS-HCC)  CBC and Auto Differential    Comprehensive Metabolic Panel    CBC and Auto Differential    Comprehensive Metabolic Panel    CT cardiac scoring wo IV contrast    CANCELED: CT cardiac scoring wo IV contrast      3. Screening for lipid disorders  Lipid Panel    Lipid Panel      4. Screening for thyroid disorder  Triiodothyronine, Free    TSH with reflex to Free T4 if abnormal    Triiodothyronine, Free    TSH with reflex to Free T4 if abnormal      5. Inadequate intake of calcium and vitamin D  Vitamin D 25-Hydroxy,Total (for eval of Vitamin D levels)    Vitamin D 25-Hydroxy,Total (for eval of Vitamin D levels)      6. Screening for blood or protein in urine  Urinalysis with Reflex Microscopic    Urinalysis with Reflex Microscopic      7. Screening mammogram for breast cancer  BI mammo bilateral screening tomosynthesis           MEDICAL DECISION MAKING:  - The current and active medical co morbidities have been considered.   - Recent lab work and relevant imaging studies have been reviewed.  Annual lab work ordered.   - Relevant correspondence/notes from other specialty consultants were reviewed.    - No medication refills required today.   -Therapy and treatment as per above plan  - Next Follow up in 1 year.       Review of Systems  12 point review of systems was negative unless mentioned above    Visit Vitals  /82 (BP Location: Left arm, Patient Position: Sitting, BP Cuff Size: Large adult)   Pulse (!) 120   Temp 36.9 °C (98.5 °F) (Temporal)   Ht 1.6 m (5' 3\")   Wt 108 kg (238 lb 3.2 oz)   SpO2 97% Comment: RA   BMI 42.20 kg/m²   OB Status IUD   Smoking Status Former   BSA 2.19 m²         Wt Readings from Last 10 Encounters:   02/20/25 108 kg (238 lb 3.2 oz)   01/21/25 109 kg (240 lb)   11/19/24 110 kg (242 lb 9.6 oz)   08/16/24 117 kg (257 lb)   02/28/24 119 kg " "(262 lb)   01/10/24 118 kg (261 lb)   12/06/23 115 kg (254 lb 6 oz)   08/08/23 112 kg (246 lb 2 oz)   08/01/23 111 kg (244 lb 3.2 oz)   07/05/23 112 kg (246 lb)     Physical Exam   General: Not in acute distress, A&O x3, alert, coopertive, well-developed  HEENT: Normocephalic, atraumatic, EOMI, moist mucous membranes  Neck: Neck supple, trachea midline, no evidence of trauma  Cardiovascular: Tachycardic but regular rhythm on exam today, S1 and S2 appreciated, no murmurs rubs gallops appreciated  Respiratory: Clear to auscultation bilaterally without wheezes, rales, rhonchi; no increased work of breathing noted  GI: Abdomen soft, nondistended, nontender to palpation, bowel sounds present  Extremities: No edema appreciated in lower extremities bilaterally, no cyanosis  Neuro: A&O x3, no focal deficits, strength and sensation intact bilaterally  Skin: Warm and dry, without lesions or rashes      RECENT LABS:  Lab Results   Component Value Date    WBC 10.3 08/10/2023    HGB 15.0 08/10/2023    HCT 46.7 (H) 08/10/2023     08/10/2023    CHOL 196 08/10/2023    TRIG 61 08/10/2023    HDL 60.2 08/10/2023    ALT 33 08/10/2023    AST 20 08/10/2023     08/10/2023    K 4.1 08/10/2023     08/10/2023    CREATININE 0.64 08/10/2023    BUN 12 08/10/2023    CO2 24 08/10/2023    TSH 1.85 08/10/2023    HGBA1C 5.4 11/02/2022     Lab Results   Component Value Date    GLUCOSE 82 08/10/2023    CALCIUM 9.7 08/10/2023     08/10/2023    K 4.1 08/10/2023    CO2 24 08/10/2023     08/10/2023    BUN 12 08/10/2023    CREATININE 0.64 08/10/2023      No results found for: \"LDLCALC\"  Lab Results   Component Value Date    HGBA1C 5.4 11/02/2022         Patricia Hruska, DO  Internal Medicine PGY-1 Resident    "

## 2025-03-10 ENCOUNTER — APPOINTMENT (OUTPATIENT)
Dept: RADIOLOGY | Facility: HOSPITAL | Age: 40
End: 2025-03-10
Payer: COMMERCIAL

## 2025-03-17 ENCOUNTER — HOSPITAL ENCOUNTER (OUTPATIENT)
Dept: RADIOLOGY | Facility: HOSPITAL | Age: 40
Discharge: HOME | End: 2025-03-17
Payer: COMMERCIAL

## 2025-03-17 VITALS — HEIGHT: 63 IN | BODY MASS INDEX: 42.17 KG/M2 | WEIGHT: 238 LBS

## 2025-03-17 DIAGNOSIS — Z12.31 SCREENING MAMMOGRAM FOR BREAST CANCER: ICD-10-CM

## 2025-03-17 PROCEDURE — 77067 SCR MAMMO BI INCL CAD: CPT

## 2025-03-17 PROCEDURE — 77067 SCR MAMMO BI INCL CAD: CPT | Performed by: RADIOLOGY

## 2025-03-17 PROCEDURE — 77063 BREAST TOMOSYNTHESIS BI: CPT | Performed by: RADIOLOGY

## 2025-03-25 DIAGNOSIS — R92.8 ABNORMAL MAMMOGRAM: Primary | ICD-10-CM

## 2025-04-03 ENCOUNTER — HOSPITAL ENCOUNTER (OUTPATIENT)
Dept: RADIOLOGY | Facility: HOSPITAL | Age: 40
Discharge: HOME | End: 2025-04-03
Payer: COMMERCIAL

## 2025-04-03 ENCOUNTER — TELEPHONE (OUTPATIENT)
Dept: PRIMARY CARE | Facility: CLINIC | Age: 40
End: 2025-04-03
Payer: COMMERCIAL

## 2025-04-03 VITALS — WEIGHT: 230 LBS | BODY MASS INDEX: 40.75 KG/M2

## 2025-04-03 DIAGNOSIS — R92.8 ABNORMAL MAMMOGRAM: ICD-10-CM

## 2025-04-03 DIAGNOSIS — R92.8 ABNORMAL FINDING ON BREAST IMAGING: Primary | ICD-10-CM

## 2025-04-03 PROCEDURE — 76642 ULTRASOUND BREAST LIMITED: CPT | Mod: LT

## 2025-04-03 PROCEDURE — 77065 DX MAMMO INCL CAD UNI: CPT | Mod: LT

## 2025-04-03 NOTE — TELEPHONE ENCOUNTER
Received call from  Radiology (Lissa) Pt scheduled for Mammogram. THIS CALL IS TO INFORM MQM. Pt has visit with Dr. Calzada. Recommends/ Scheduled upcoming Biopsy at [facility].

## 2025-04-04 ENCOUNTER — OFFICE VISIT (OUTPATIENT)
Dept: SURGERY | Facility: HOSPITAL | Age: 40
End: 2025-04-04
Payer: COMMERCIAL

## 2025-04-04 VITALS
HEIGHT: 63 IN | SYSTOLIC BLOOD PRESSURE: 126 MMHG | WEIGHT: 235 LBS | DIASTOLIC BLOOD PRESSURE: 80 MMHG | BODY MASS INDEX: 41.64 KG/M2

## 2025-04-04 DIAGNOSIS — R92.8 ABNORMAL MAMMOGRAM: Primary | ICD-10-CM

## 2025-04-04 PROCEDURE — 99204 OFFICE O/P NEW MOD 45 MIN: CPT | Performed by: SURGERY

## 2025-04-04 PROCEDURE — 99214 OFFICE O/P EST MOD 30 MIN: CPT | Performed by: SURGERY

## 2025-04-04 PROCEDURE — 3008F BODY MASS INDEX DOCD: CPT | Performed by: SURGERY

## 2025-04-04 RX ORDER — MELATONIN 1 MG
TABLET,CHEWABLE ORAL
COMMUNITY

## 2025-04-04 ASSESSMENT — ENCOUNTER SYMPTOMS
ENDOCRINE NEGATIVE: 1
NEUROLOGICAL NEGATIVE: 1
CONSTITUTIONAL NEGATIVE: 1
CARDIOVASCULAR NEGATIVE: 1
RESPIRATORY NEGATIVE: 1
ALLERGIC/IMMUNOLOGIC NEGATIVE: 1
MUSCULOSKELETAL NEGATIVE: 1
HEMATOLOGIC/LYMPHATIC NEGATIVE: 1
PSYCHIATRIC NEGATIVE: 1
GASTROINTESTINAL NEGATIVE: 1
EYES NEGATIVE: 1

## 2025-04-04 NOTE — PROGRESS NOTES
Subjective   Patient ID: Nan Thomas is a 40 y.o. female who presents for New Patient Visit (Biopsy consult).  HPI    Patient presents for abnormal breast imaging.  Baseline screening mammogram 3/17/2025 category 0 for small mass seen inferior medial left breast mid depth.  Subsequent diagnostic mammogram with ultrasound demonstrates 1.4 cm mildly irregular mass left lower inner quadrant 12 cm from the nipple, seen by ultrasound as a 1.2 x 0.4 x 1.1 cm heterogenous indistinct mass 8:30 position 9 cm from the nipple.  Category 4.  Negative axilla.    No breast complaints    Breast history:  G2, P1, first live birth age 38,  8 months  Family history none  Menarche age 11  Irregular menses, IUD in place    Review of Systems   Constitutional: Negative.    HENT: Negative.     Eyes: Negative.    Respiratory: Negative.     Cardiovascular: Negative.    Gastrointestinal: Negative.    Endocrine: Negative.    Genitourinary: Negative.    Musculoskeletal: Negative.    Skin: Negative.    Allergic/Immunologic: Negative.    Neurological: Negative.    Hematological: Negative.    Psychiatric/Behavioral: Negative.           No past medical history on file.  Past Surgical History:   Procedure Laterality Date     SECTION, CLASSIC  2023    OTHER SURGICAL HISTORY  2022    Corneal lasik    US GUIDED THYROID BIOPSY  2023    US GUIDED THYROID BIOPSY 2023 [facility]     Family History   Problem Relation Name Age of Onset    Cancer Mother      Hypertension Mother      Other (secondary malignancy of cervical esophagus with unkown primary) Mother      Hypertension Other grandparent       Social History     Socioeconomic History    Marital status:    Tobacco Use    Smoking status: Former     Types: Cigarettes    Smokeless tobacco: Never   Vaping Use    Vaping status: Never Used   Substance and Sexual Activity    Alcohol use: Yes     Comment: occassional    Drug use: Not Currently          Current  Outpatient Medications:     cholecalciferol (Vitamin D-3) 50 MCG (2000 UT) tablet, Take 1 tablet (2,000 Units) by mouth once daily., Disp: , Rfl:     melatonin 1 mg tablet,chewable, Chew., Disp: , Rfl:     multivitamin with minerals-folic acid-vitamin k-Co Q10 (Aquadeks) 100-350-5 mcg-mcg-mg chewable tablet, Chew 1 tablet once daily., Disp: , Rfl:      Objective   Vitals:    04/04/25 1453   BP: 126/80      Physical Exam  Constitutional:       General: She is not in acute distress.     Appearance: Normal appearance.   HENT:      Head: Normocephalic and atraumatic.      Mouth/Throat:      Mouth: Mucous membranes are moist.   Eyes:      Pupils: Pupils are equal, round, and reactive to light.   Cardiovascular:      Rate and Rhythm: Normal rate and regular rhythm.   Pulmonary:      Effort: Pulmonary effort is normal.   Chest:      Chest wall: No mass, swelling or tenderness.   Breasts:     Right: Normal. No mass, nipple discharge, skin change or tenderness.      Left: Normal. No mass, nipple discharge, skin change or tenderness.   Abdominal:      General: Abdomen is flat.      Palpations: Abdomen is soft.   Musculoskeletal:         General: Normal range of motion.      Cervical back: Normal range of motion.   Lymphadenopathy:      Cervical: No cervical adenopathy.      Upper Body:      Right upper body: No axillary adenopathy.      Left upper body: No axillary adenopathy.   Skin:     General: Skin is warm and dry.   Neurological:      Mental Status: She is alert. Mental status is at baseline.   Psychiatric:         Mood and Affect: Mood normal.           Assessment/Plan   Problem List Items Addressed This Visit    None  Visit Diagnoses         Codes    Abnormal mammogram    -  Primary R92.8          Imaging findings discussed.  Core biopsy left breast pending.  Will follow-up in the office for discussion regarding pathology and further recommendations.    Leana Calzada MD

## 2025-04-16 ENCOUNTER — OFFICE VISIT (OUTPATIENT)
Dept: PRIMARY CARE | Facility: CLINIC | Age: 40
End: 2025-04-16
Payer: COMMERCIAL

## 2025-04-16 ENCOUNTER — PHARMACY VISIT (OUTPATIENT)
Dept: PHARMACY | Facility: CLINIC | Age: 40
End: 2025-04-16
Payer: COMMERCIAL

## 2025-04-16 VITALS
WEIGHT: 240 LBS | BODY MASS INDEX: 42.53 KG/M2 | HEART RATE: 99 BPM | DIASTOLIC BLOOD PRESSURE: 80 MMHG | TEMPERATURE: 98.5 F | RESPIRATION RATE: 18 BRPM | SYSTOLIC BLOOD PRESSURE: 130 MMHG | OXYGEN SATURATION: 98 %

## 2025-04-16 DIAGNOSIS — H66.92 ACUTE LEFT OTITIS MEDIA: Primary | ICD-10-CM

## 2025-04-16 PROCEDURE — 99213 OFFICE O/P EST LOW 20 MIN: CPT | Performed by: NURSE PRACTITIONER

## 2025-04-16 PROCEDURE — 1036F TOBACCO NON-USER: CPT | Performed by: NURSE PRACTITIONER

## 2025-04-16 PROCEDURE — RXMED WILLOW AMBULATORY MEDICATION CHARGE

## 2025-04-16 RX ORDER — METHYLPREDNISOLONE 4 MG/1
TABLET ORAL
Qty: 21 TABLET | Refills: 0 | Status: SHIPPED | OUTPATIENT
Start: 2025-04-16 | End: 2025-04-22

## 2025-04-16 RX ORDER — AMOXICILLIN AND CLAVULANATE POTASSIUM 875; 125 MG/1; MG/1
875 TABLET, FILM COATED ORAL 2 TIMES DAILY
Qty: 20 TABLET | Refills: 0 | Status: SHIPPED | OUTPATIENT
Start: 2025-04-16 | End: 2025-04-26

## 2025-04-16 ASSESSMENT — ENCOUNTER SYMPTOMS
VOMITING: 0
APPETITE CHANGE: 0
FEVER: 0
FATIGUE: 0
WHEEZING: 0
ABDOMINAL PAIN: 0
NAUSEA: 0
SINUS PRESSURE: 0
CHILLS: 0
SINUS PAIN: 0
SHORTNESS OF BREATH: 0
RHINORRHEA: 0
MYALGIAS: 0
DIARRHEA: 0
COUGH: 0
SORE THROAT: 0

## 2025-04-16 NOTE — PROGRESS NOTES
Subjective   Patient ID: Nan Thomas is a 40 y.o. female who presents for Earache.    Left ear pain started a few days ago. She has taken ibuprofen for the pain. She also tried a warm wash cloth on her ear. Feeling well otherwise. No other symptoms.          Review of Systems   Constitutional:  Negative for appetite change, chills, fatigue and fever.   HENT:  Positive for ear pain. Negative for congestion, rhinorrhea, sinus pressure, sinus pain and sore throat.    Respiratory:  Negative for cough, shortness of breath and wheezing.    Cardiovascular:  Negative for chest pain.   Gastrointestinal:  Negative for abdominal pain, diarrhea, nausea and vomiting.   Musculoskeletal:  Negative for myalgias.     Objective   /80   Pulse 99   Temp 36.9 °C (98.5 °F)   Resp 18   Wt 109 kg (240 lb)   SpO2 98%   BMI 42.53 kg/m²     Physical Exam  Vitals reviewed.   Constitutional:       General: She is not in acute distress.     Appearance: Normal appearance. She is not toxic-appearing.   HENT:      Head: Atraumatic.      Right Ear: Tympanic membrane, ear canal and external ear normal.      Left Ear: Ear canal and external ear normal. Tympanic membrane is erythematous and bulging.      Nose: Nose normal.      Mouth/Throat:      Mouth: Mucous membranes are moist.      Pharynx: Oropharynx is clear. No oropharyngeal exudate or posterior oropharyngeal erythema.      Comments: Tonsils normally enlarged, uvula midline  Cardiovascular:      Rate and Rhythm: Normal rate and regular rhythm.      Heart sounds: Normal heart sounds. No murmur heard.  Pulmonary:      Effort: Pulmonary effort is normal.      Breath sounds: Normal breath sounds. No wheezing or rhonchi.   Skin:     General: Skin is warm and dry.   Neurological:      General: No focal deficit present.      Mental Status: She is alert.   Psychiatric:         Mood and Affect: Mood normal.     Assessment/Plan   Problem List Items Addressed This Visit    None  Visit  Diagnoses         Codes      Acute left otitis media    -  Primary H66.92    Relevant Medications    amoxicillin-pot clavulanate (Augmentin) 875-125 mg tablet    methylPREDNISolone (Medrol Dospak) 4 mg tablets        Patient started on augmentin and medrol tracy at this time. Pt reminded to avoid other anti-inflammatories while on the steroids; she agreed. Advised patient on use of humidifier and hot steam treatments. Discussed that patient is to drink plenty of fluids and stay well hydrated. Can take tylenol as needed for any fevers or discomfort. She is to continue the use of flonase. Discussed that patient is to go to the ER for any chest pain, difficulty breathing, shortness of breath or new/concerning symptoms; she agreed. Pt to follow up with PCP in ten days to ensure improvement of otitis media. Follow up sooner if needed

## 2025-04-17 ENCOUNTER — HOSPITAL ENCOUNTER (OUTPATIENT)
Dept: RADIOLOGY | Facility: HOSPITAL | Age: 40
Discharge: HOME | End: 2025-04-17
Payer: COMMERCIAL

## 2025-04-17 DIAGNOSIS — N63.20 BREAST MASS, LEFT: ICD-10-CM

## 2025-04-17 DIAGNOSIS — R92.8 ABNORMAL FINDING ON BREAST IMAGING: ICD-10-CM

## 2025-04-17 PROCEDURE — 19081 BX BREAST 1ST LESION STRTCTC: CPT | Mod: LT

## 2025-04-17 PROCEDURE — C1739 HC OR 278 NO HCPCS: HCPCS

## 2025-04-17 PROCEDURE — 2500000004 HC RX 250 GENERAL PHARMACY W/ HCPCS (ALT 636 FOR OP/ED): Mod: JZ | Performed by: SURGERY

## 2025-04-17 PROCEDURE — 2720000007 HC OR 272 NO HCPCS

## 2025-04-17 PROCEDURE — 2780000003 HC OR 278 NO HCPCS

## 2025-04-17 RX ADMIN — Medication 10 ML: at 14:04

## 2025-04-21 ENCOUNTER — HOSPITAL ENCOUNTER (OUTPATIENT)
Dept: RADIOLOGY | Facility: HOSPITAL | Age: 40
Discharge: HOME | End: 2025-04-21
Payer: COMMERCIAL

## 2025-04-21 DIAGNOSIS — I47.11 INAPPROPRIATE SINUS TACHYCARDIA (CMS-HCC): ICD-10-CM

## 2025-04-21 PROCEDURE — 75571 CT HRT W/O DYE W/CA TEST: CPT

## 2025-04-23 ENCOUNTER — APPOINTMENT (OUTPATIENT)
Dept: RADIOLOGY | Facility: HOSPITAL | Age: 40
End: 2025-04-23
Payer: COMMERCIAL

## 2025-04-23 LAB
LABORATORY COMMENT REPORT: NORMAL
PATH REPORT.FINAL DX SPEC: NORMAL
PATH REPORT.GROSS SPEC: NORMAL
PATH REPORT.RELEVANT HX SPEC: NORMAL
PATH REPORT.TOTAL CANCER: NORMAL

## 2025-05-02 ENCOUNTER — OFFICE VISIT (OUTPATIENT)
Dept: SURGERY | Facility: HOSPITAL | Age: 40
End: 2025-05-02
Payer: COMMERCIAL

## 2025-05-02 DIAGNOSIS — R92.8 ABNORMAL MAMMOGRAM: Primary | ICD-10-CM

## 2025-05-02 PROCEDURE — 99213 OFFICE O/P EST LOW 20 MIN: CPT | Performed by: SURGERY

## 2025-05-02 ASSESSMENT — ENCOUNTER SYMPTOMS
GASTROINTESTINAL NEGATIVE: 1
NEUROLOGICAL NEGATIVE: 1
MUSCULOSKELETAL NEGATIVE: 1
HEMATOLOGIC/LYMPHATIC NEGATIVE: 1
CARDIOVASCULAR NEGATIVE: 1
EYES NEGATIVE: 1
ENDOCRINE NEGATIVE: 1
RESPIRATORY NEGATIVE: 1
PSYCHIATRIC NEGATIVE: 1
CONSTITUTIONAL NEGATIVE: 1
ALLERGIC/IMMUNOLOGIC NEGATIVE: 1

## 2025-05-02 NOTE — PROGRESS NOTES
Subjective   Patient ID: Nan Thomas is a 40 y.o. female who presents for No chief complaint on file..  HPI  Status post core biopsy left breast mass with pathology demonstrating fatty breast tissue without significant pathology.  Benign and concordant on subsequent image review.    Initial history:  Patient presents for abnormal breast imaging.  Baseline screening mammogram 3/17/2025 category 0 for small mass seen inferior medial left breast mid depth.  Subsequent diagnostic mammogram with ultrasound demonstrates 1.4 cm mildly irregular mass left lower inner quadrant 12 cm from the nipple, seen by ultrasound as a 1.2 x 0.4 x 1.1 cm heterogenous indistinct mass 8:30 position 9 cm from the nipple.  Category 4.  Negative axilla.    No breast complaints    Breast history:  G2, P1, first live birth age 38,  8 months  Family history none  Menarche age 11  Irregular menses, IUD in place    Review of Systems   Constitutional: Negative.    HENT: Negative.     Eyes: Negative.    Respiratory: Negative.     Cardiovascular: Negative.    Gastrointestinal: Negative.    Endocrine: Negative.    Genitourinary: Negative.    Musculoskeletal: Negative.    Skin: Negative.    Allergic/Immunologic: Negative.    Neurological: Negative.    Hematological: Negative.    Psychiatric/Behavioral: Negative.           No past medical history on file.  Past Surgical History:   Procedure Laterality Date     SECTION, CLASSIC  2023    OTHER SURGICAL HISTORY  2022    Corneal lasik    US GUIDED THYROID BIOPSY  2023    US GUIDED THYROID BIOPSY 2023 [facility]      Family History   Problem Relation Name Age of Onset    Cancer Mother      Hypertension Mother      Other (secondary malignancy of cervical esophagus with unkown primary) Mother      Hypertension Other grandparent       Social History     Socioeconomic History    Marital status:    Tobacco Use    Smoking status: Former     Types: Cigarettes     Smokeless tobacco: Never   Vaping Use    Vaping status: Never Used   Substance and Sexual Activity    Alcohol use: Yes     Comment: occassional    Drug use: Not Currently          Current Outpatient Medications:     cholecalciferol (Vitamin D-3) 50 MCG (2000 UT) tablet, Take 1 tablet (2,000 Units) by mouth once daily., Disp: , Rfl:     melatonin 1 mg tablet,chewable, Chew., Disp: , Rfl:     multivitamin with minerals-folic acid-vitamin k-Co Q10 (Aquadeks) 100-350-5 mcg-mcg-mg chewable tablet, Chew 1 tablet once daily., Disp: , Rfl:      Objective   There were no vitals filed for this visit.     Physical Exam  Constitutional:       General: She is not in acute distress.     Appearance: Normal appearance.   HENT:      Head: Normocephalic and atraumatic.      Mouth/Throat:      Mouth: Mucous membranes are moist.   Eyes:      Pupils: Pupils are equal, round, and reactive to light.   Cardiovascular:      Rate and Rhythm: Normal rate and regular rhythm.   Pulmonary:      Effort: Pulmonary effort is normal.   Chest:      Chest wall: No mass, swelling or tenderness.   Breasts:     Right: Normal. No mass, nipple discharge, skin change or tenderness.      Left: Normal. No mass, nipple discharge, skin change or tenderness.   Abdominal:      General: Abdomen is flat.      Palpations: Abdomen is soft.   Musculoskeletal:         General: Normal range of motion.      Cervical back: Normal range of motion.   Lymphadenopathy:      Cervical: No cervical adenopathy.      Upper Body:      Right upper body: No axillary adenopathy.      Left upper body: No axillary adenopathy.   Skin:     General: Skin is warm and dry.   Neurological:      Mental Status: She is alert. Mental status is at baseline.   Psychiatric:         Mood and Affect: Mood normal.           Assessment/Plan   Problem List Items Addressed This Visit    None      Imaging and benign pathology findings discussed.  Recommend interval follow-up with diagnostic left mammogram  WITH ultrasound in 6 months, return at that time  Leana Calzada MD

## 2025-06-30 ENCOUNTER — HOSPITAL ENCOUNTER (OUTPATIENT)
Dept: RADIOLOGY | Facility: CLINIC | Age: 40
Discharge: HOME | End: 2025-06-30
Payer: COMMERCIAL

## 2025-06-30 ENCOUNTER — OFFICE VISIT (OUTPATIENT)
Dept: ORTHOPEDIC SURGERY | Facility: CLINIC | Age: 40
End: 2025-06-30
Payer: COMMERCIAL

## 2025-06-30 DIAGNOSIS — M79.641 RIGHT HAND PAIN: ICD-10-CM

## 2025-06-30 PROCEDURE — 99204 OFFICE O/P NEW MOD 45 MIN: CPT | Performed by: STUDENT IN AN ORGANIZED HEALTH CARE EDUCATION/TRAINING PROGRAM

## 2025-06-30 PROCEDURE — 99212 OFFICE O/P EST SF 10 MIN: CPT | Performed by: STUDENT IN AN ORGANIZED HEALTH CARE EDUCATION/TRAINING PROGRAM

## 2025-06-30 PROCEDURE — 73130 X-RAY EXAM OF HAND: CPT | Mod: RT

## 2025-06-30 PROCEDURE — 73130 X-RAY EXAM OF HAND: CPT | Mod: RIGHT SIDE | Performed by: STUDENT IN AN ORGANIZED HEALTH CARE EDUCATION/TRAINING PROGRAM

## 2025-06-30 NOTE — PROGRESS NOTES
History of Present Illness:  Presents with mass on right long finger finger.  The mass has been present for 3 years. The patient denies any inciting trauma. The pain is localized about the right long distal phalanx pad. It is described as moderate. The pain occurs intermittantly and is worse with activity. There have been no signs of infection such as purulent drainage or erythema. The patient presents due to persistent symptoms even with activity modification.    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     The patient's past medical history, family history, social history, and review of systems were reviewed. History is otherwise negative except as stated in the HPI.    Review of Systems   GENERAL: Negative for malaise, significant weight loss, fever  MUSCULOSKELETAL: see HPI  NEURO:  Negative    Physical Examination:  General: Alert and oriented to person, place, and time.  No acute distress and breathing comfortably: Pleasant and cooperative with examination.  HEENT: Head is normocephalic and atraumatic.  Neck: Supple, no visible swelling.  Cardiovascular: No palpable tachycardia  Lungs: No audible wheezing or labored breathing  Abdomen: Nondistended.  On musculoskeletal examination, the patient has full elbow and wrist range of motion. In regards to the hand, there is an obvious mass over the right long distal phalanx.. This is most consistent with a retinacular cyst. There is tenderness to palpation about the mass. Finger range of motion is full with intact flexion and extension. The patient can make a full composite fist. All fingers are without triggering and are without pain over the A1 pulley.  Sensation and motor function are intact in the radial, ulnar, and median nerve distribution. There is no obvious thenar or intrinsic atrophy. The hand itself is warm and well perfused. The skin is intact throughout. The contralateral hand and wrist are normal to inspection, range of motion, stability, and strength.    Imaging:  AP,  lateral, and oblique radiographs of the right hand were reviewed.  Reveal no acute osseous abnormality    Assessment:  Patient with a right long finger retinacular  cyst     Plan:   Long finger mass Excision. I had a long discussion with the patient regarding the mass. Based on its classic location, I believe it is a retinacular cyst. At this point, I discussed the risks/benefits/expected outcomes of the three treatment options: observation vs aspiration vs surgical excision. In order to decrease symptoms and to minimize recurrence, the patient has elected to proceed with excision. The risks/benefits of surgery were reviewed. The risks include, but are not limited to, infection, bleeding, nerve/vessel injury, scar formation, recurrence, stiffness, and anaesthetic complications.  I have answered all questions regarding the surgery itself and the post-operative course. The patient consented to surgery and will be scheduled in the near future. The patient prefers to do this under local anaesthesia.    Follow up: 2 weeks postop    Oliver Uriostegui PA-C    In a face to face encounter, I performed a history and physical examination, discussed pertinent diagnostic studies if indicated, and discussed diagnosis and management strategies with both the patient and the mid-level provider. I reviewed the mid-level's note and agree with the documented findings and plan of care. Addendums and additions to note have been added as appropriate.     Melody Kruger MD  Orthopedic Surgery     Spontaneous, unlabored and symmetrical

## 2025-07-23 DIAGNOSIS — M79.641 RIGHT HAND PAIN: Primary | ICD-10-CM

## 2025-07-23 PROCEDURE — RXMED WILLOW AMBULATORY MEDICATION CHARGE

## 2025-07-23 RX ORDER — IBUPROFEN 800 MG/1
800 TABLET, FILM COATED ORAL 3 TIMES DAILY
Qty: 15 TABLET | Refills: 0 | Status: SHIPPED | OUTPATIENT
Start: 2025-07-23 | End: 2025-07-29

## 2025-07-23 RX ORDER — TRAMADOL HYDROCHLORIDE 50 MG/1
50 TABLET, FILM COATED ORAL EVERY 8 HOURS PRN
Qty: 5 TABLET | Refills: 0 | Status: SHIPPED | OUTPATIENT
Start: 2025-07-23

## 2025-07-24 ENCOUNTER — PHARMACY VISIT (OUTPATIENT)
Dept: PHARMACY | Facility: CLINIC | Age: 40
End: 2025-07-24
Payer: COMMERCIAL

## 2025-07-24 PROCEDURE — 26115 EXC HAND LES SC < 1.5 CM: CPT | Performed by: STUDENT IN AN ORGANIZED HEALTH CARE EDUCATION/TRAINING PROGRAM

## 2025-07-31 ENCOUNTER — OFFICE VISIT (OUTPATIENT)
Dept: ORTHOPEDIC SURGERY | Facility: CLINIC | Age: 40
End: 2025-07-31
Payer: COMMERCIAL

## 2025-07-31 VITALS — BODY MASS INDEX: 42.33 KG/M2 | WEIGHT: 230 LBS | HEIGHT: 62 IN

## 2025-07-31 DIAGNOSIS — M79.641 RIGHT HAND PAIN: Primary | ICD-10-CM

## 2025-07-31 PROCEDURE — 99212 OFFICE O/P EST SF 10 MIN: CPT | Performed by: ORTHOPAEDIC SURGERY

## 2025-07-31 PROCEDURE — 99024 POSTOP FOLLOW-UP VISIT: CPT | Performed by: ORTHOPAEDIC SURGERY

## 2025-07-31 PROCEDURE — 3008F BODY MASS INDEX DOCD: CPT | Performed by: ORTHOPAEDIC SURGERY

## 2025-08-03 NOTE — PROGRESS NOTES
History of Present Illness  Chief Complaint   Patient presents with    Right Middle Finger - Post-op, Wound Check     S/p mass excision with EO on 2025       Patient having issues in regards to the right middle finger specifically with sensitivity surrounding the surgical site.    Medical History[1]    Medication Documentation Review Audit       Reviewed by Sara Richard MA (Medical Assistant) on 25 at 1449      Medication Order Taking? Sig Documenting Provider Last Dose Status   cholecalciferol (Vitamin D-3) 50 MCG (2000 UT) tablet [id number] No Take 1 tablet (2,000 Units) by mouth once daily. Historical Provider, MD Taking Active   ibuprofen 800 mg tablet [id number]  Take 1 tablet (800 mg) by mouth 3 times a day for 5 days. Oliver Uriostegui PA-C   25 2359   melatonin 1 mg tablet,chewable [id number]  Chew. Historical Provider, MD  Active   multivitamin with minerals-folic acid-vitamin k-Co Q10 (Aquadeks) 100-350-5 mcg-mcg-mg chewable tablet [id number] No Chew 1 tablet once daily. Historical Provider, MD Taking Active   traMADol (Ultram) 50 mg tablet [id number]  Take 1 tablet (50 mg) by mouth every 8 hours if needed for severe pain (7 - 10) for up to 5 doses. Oliver Uriostegui PA-C  Active                    RX Allergies[2]    Social History     Socioeconomic History    Marital status:      Spouse name: Not on file    Number of children: Not on file    Years of education: Not on file    Highest education level: Not on file   Occupational History    Not on file   Tobacco Use    Smoking status: Former     Types: Cigarettes    Smokeless tobacco: Never   Vaping Use    Vaping status: Never Used   Substance and Sexual Activity    Alcohol use: Yes     Comment: occassional    Drug use: Not Currently    Sexual activity: Not on file   Other Topics Concern    Not on file   Social History Narrative    Not on file     Social Drivers of Health     Financial Resource Strain: Not on file   Food Insecurity: Not  on file   Transportation Needs: Not on file   Physical Activity: Not on file   Stress: Not on file   Social Connections: Not on file   Intimate Partner Violence: Not on file   Housing Stability: Not on file       Surgical History[3]         Review of Systems   GENERAL: Negative for malaise, significant weight loss, fever  MUSCULOSKELETAL: see HPI  NEURO:  Negative      Exam  Right middle finger: Patient has well-approximated surgical incision that is noninfected with no evidence of dehiscence, drainage or erythema.  There is no blanching of the skin.     Imaging  XR hand right 3+ views  Narrative: Interpreted By:  Fercho Kruger,   STUDY:  XR HAND RIGHT 3+ VIEWS; ;  2025 2:49 pm      INDICATION:  Signs/Symptoms:Pain.      ,M79.641 Pain in right hand      COMPARISON:  None.      ACCESSION NUMBER(S):  [id number]      ORDERING CLINICIAN:  FERCHO REYNA      FINDINGS:  Three views of the right long finger demonstrate no acute osseous  process. Joint space well-maintained      Impression:     As above      MACRO:  None      Signed by: Fercho Kruger 2025 3:12 PM  Dictation workstation:   [id number]         Assessment  Progressive healing status post mass excision right middle finger     Plan  In an effort to alleviate patient's symptoms a padded splint was applied to the right middle finger.  This will help with contact related irritation.  Recommend follow-up with Dr. Kruger at her previously scheduled visit.          [1] No past medical history on file.  [2]   Allergies  Allergen Reactions    Sulfamethoxazole-Trimethoprim Unknown   [3]   Past Surgical History:  Procedure Laterality Date     SECTION, CLASSIC  2023    OTHER SURGICAL HISTORY  2022    Corneal lasik    US GUIDED THYROID BIOPSY  2023    US GUIDED THYROID BIOPSY 2023 [facility]

## 2025-08-07 ENCOUNTER — APPOINTMENT (OUTPATIENT)
Dept: ORTHOPEDIC SURGERY | Facility: CLINIC | Age: 40
End: 2025-08-07
Payer: COMMERCIAL

## 2025-08-08 ENCOUNTER — APPOINTMENT (OUTPATIENT)
Dept: ORTHOPEDIC SURGERY | Facility: CLINIC | Age: 40
End: 2025-08-08
Payer: COMMERCIAL

## 2025-08-08 DIAGNOSIS — D48.19 TENDON SHEATH GIANT CELL TUMOR: Primary | ICD-10-CM

## 2025-08-08 NOTE — PROGRESS NOTES
2 weeks S/p right long finger mass excision 7/24/2025. Doing well. Denies numbness or tingling.     Physical Examination:  The patient appears to be their stated age, is in no apparent distress, and is oriented x3. The patients mood and affect are appropriate. The patients gait is normal. The examination of the limb in question was performed in comparison to the contralateral limb.    Incision c/d/I  AIN, PIN, ulnar intact  SILT A/R/U/M  Hand wwp    Assessment:  2 weeks post op.  Sutures removed today.  Pathology resulted as giant cell tumor, this was discussed with patient today.    Plan:   The patient will progress to weightbearing to tolerance with theupper extremity.  We reviewed range of motion recovery exercises to the digits and wrist, scar massage and desensitization techniques.  The patient will work on these on her own with home exercise program.  Follow up with our office on an as-needed basis.  We did offer a formal therapy referral.  They declined in favor of home exercise program.   The patient verbalized agreement and understanding of plan for care.  All questions were answered at today's visit.          Oliver Uriostegui PA-C  Orthopedic Surgery